# Patient Record
Sex: MALE | Race: WHITE | Employment: FULL TIME | URBAN - NONMETROPOLITAN AREA
[De-identification: names, ages, dates, MRNs, and addresses within clinical notes are randomized per-mention and may not be internally consistent; named-entity substitution may affect disease eponyms.]

---

## 2022-06-29 ENCOUNTER — HOSPITAL ENCOUNTER (INPATIENT)
Age: 47
LOS: 2 days | Discharge: HOME | DRG: 287 | End: 2022-07-01
Attending: EMERGENCY MEDICINE | Admitting: STUDENT IN AN ORGANIZED HEALTH CARE EDUCATION/TRAINING PROGRAM
Payer: COMMERCIAL

## 2022-06-29 ENCOUNTER — APPOINTMENT (OUTPATIENT)
Dept: GENERAL RADIOLOGY | Age: 47
DRG: 287 | End: 2022-06-29
Payer: COMMERCIAL

## 2022-06-29 DIAGNOSIS — J18.9 ATYPICAL PNEUMONIA: ICD-10-CM

## 2022-06-29 DIAGNOSIS — I50.31 ACUTE DIASTOLIC CONGESTIVE HEART FAILURE (HCC): Primary | ICD-10-CM

## 2022-06-29 DIAGNOSIS — I44.7 LEFT BUNDLE BRANCH BLOCK: ICD-10-CM

## 2022-06-29 DIAGNOSIS — R93.1 ABNORMAL ECHOCARDIOGRAM: ICD-10-CM

## 2022-06-29 LAB
ALBUMIN SERPL-MCNC: 4 G/DL (ref 3.5–5.2)
ALP BLD-CCNC: 77 U/L (ref 40–130)
ALT SERPL-CCNC: 20 U/L (ref 5–41)
ANION GAP SERPL CALCULATED.3IONS-SCNC: 13 MMOL/L (ref 7–19)
AST SERPL-CCNC: 22 U/L (ref 5–40)
BASOPHILS ABSOLUTE: 0.1 K/UL (ref 0–0.2)
BASOPHILS RELATIVE PERCENT: 0.2 % (ref 0–1)
BILIRUB SERPL-MCNC: <0.2 MG/DL (ref 0.2–1.2)
BILIRUBIN URINE: NEGATIVE
BLOOD, URINE: NEGATIVE
BUN BLDV-MCNC: 21 MG/DL (ref 6–20)
C-REACTIVE PROTEIN: 1.79 MG/DL (ref 0–0.5)
CALCIUM SERPL-MCNC: 9.1 MG/DL (ref 8.6–10)
CHLORIDE BLD-SCNC: 107 MMOL/L (ref 98–111)
CLARITY: CLEAR
CO2: 26 MMOL/L (ref 22–29)
COLOR: YELLOW
CREAT SERPL-MCNC: 0.8 MG/DL (ref 0.5–1.2)
EOSINOPHILS ABSOLUTE: 0 K/UL (ref 0–0.6)
EOSINOPHILS RELATIVE PERCENT: 0.1 % (ref 0–5)
GFR AFRICAN AMERICAN: >59
GFR NON-AFRICAN AMERICAN: >60
GLUCOSE BLD-MCNC: 95 MG/DL (ref 74–109)
GLUCOSE URINE: NEGATIVE MG/DL
HCT VFR BLD CALC: 43.4 % (ref 42–52)
HEMOGLOBIN: 13.9 G/DL (ref 14–18)
IMMATURE GRANULOCYTES #: 0.2 K/UL
KETONES, URINE: NEGATIVE MG/DL
LEUKOCYTE ESTERASE, URINE: NEGATIVE
LV EF: 35 %
LVEF MODALITY: NORMAL
LYMPHOCYTES ABSOLUTE: 2.6 K/UL (ref 1.1–4.5)
LYMPHOCYTES RELATIVE PERCENT: 10.7 % (ref 20–40)
MCH RBC QN AUTO: 28.5 PG (ref 27–31)
MCHC RBC AUTO-ENTMCNC: 32 G/DL (ref 33–37)
MCV RBC AUTO: 88.9 FL (ref 80–94)
MONOCYTES ABSOLUTE: 1.3 K/UL (ref 0–0.9)
MONOCYTES RELATIVE PERCENT: 5.2 % (ref 0–10)
NEUTROPHILS ABSOLUTE: 20.4 K/UL (ref 1.5–7.5)
NEUTROPHILS RELATIVE PERCENT: 83 % (ref 50–65)
NITRITE, URINE: NEGATIVE
PDW BLD-RTO: 13.3 % (ref 11.5–14.5)
PH UA: 8 (ref 5–8)
PLATELET # BLD: 323 K/UL (ref 130–400)
PMV BLD AUTO: 9.9 FL (ref 9.4–12.4)
POTASSIUM REFLEX MAGNESIUM: 3.6 MMOL/L (ref 3.5–5)
PRO-BNP: 4483 PG/ML (ref 0–450)
PROCALCITONIN: 0.08 NG/ML (ref 0–0.09)
PROTEIN UA: NEGATIVE MG/DL
RBC # BLD: 4.88 M/UL (ref 4.7–6.1)
SARS-COV-2, NAAT: NOT DETECTED
SODIUM BLD-SCNC: 146 MMOL/L (ref 136–145)
SPECIFIC GRAVITY UA: 1.02 (ref 1–1.03)
T4 FREE: 1.17 NG/DL (ref 0.93–1.7)
TOTAL PROTEIN: 6.9 G/DL (ref 6.6–8.7)
TROPONIN: <0.01 NG/ML (ref 0–0.03)
TSH SERPL DL<=0.05 MIU/L-ACNC: 1.98 UIU/ML (ref 0.27–4.2)
UROBILINOGEN, URINE: 0.2 E.U./DL
WBC # BLD: 24.6 K/UL (ref 4.8–10.8)

## 2022-06-29 PROCEDURE — 93005 ELECTROCARDIOGRAM TRACING: CPT | Performed by: EMERGENCY MEDICINE

## 2022-06-29 PROCEDURE — 99285 EMERGENCY DEPT VISIT HI MDM: CPT

## 2022-06-29 PROCEDURE — 86140 C-REACTIVE PROTEIN: CPT

## 2022-06-29 PROCEDURE — 1210000000 HC MED SURG R&B

## 2022-06-29 PROCEDURE — 71045 X-RAY EXAM CHEST 1 VIEW: CPT | Performed by: RADIOLOGY

## 2022-06-29 PROCEDURE — 99221 1ST HOSP IP/OBS SF/LOW 40: CPT | Performed by: INTERNAL MEDICINE

## 2022-06-29 PROCEDURE — 36415 COLL VENOUS BLD VENIPUNCTURE: CPT

## 2022-06-29 PROCEDURE — 6370000000 HC RX 637 (ALT 250 FOR IP)

## 2022-06-29 PROCEDURE — 83880 ASSAY OF NATRIURETIC PEPTIDE: CPT

## 2022-06-29 PROCEDURE — 81003 URINALYSIS AUTO W/O SCOPE: CPT

## 2022-06-29 PROCEDURE — 94640 AIRWAY INHALATION TREATMENT: CPT

## 2022-06-29 PROCEDURE — 84484 ASSAY OF TROPONIN QUANT: CPT

## 2022-06-29 PROCEDURE — 87040 BLOOD CULTURE FOR BACTERIA: CPT

## 2022-06-29 PROCEDURE — C8929 TTE W OR WO FOL WCON,DOPPLER: HCPCS

## 2022-06-29 PROCEDURE — 84443 ASSAY THYROID STIM HORMONE: CPT

## 2022-06-29 PROCEDURE — 2580000003 HC RX 258: Performed by: EMERGENCY MEDICINE

## 2022-06-29 PROCEDURE — 96374 THER/PROPH/DIAG INJ IV PUSH: CPT

## 2022-06-29 PROCEDURE — 85025 COMPLETE CBC W/AUTO DIFF WBC: CPT

## 2022-06-29 PROCEDURE — 93005 ELECTROCARDIOGRAM TRACING: CPT | Performed by: INTERNAL MEDICINE

## 2022-06-29 PROCEDURE — 84145 PROCALCITONIN (PCT): CPT

## 2022-06-29 PROCEDURE — 87635 SARS-COV-2 COVID-19 AMP PRB: CPT

## 2022-06-29 PROCEDURE — 6360000004 HC RX CONTRAST MEDICATION: Performed by: STUDENT IN AN ORGANIZED HEALTH CARE EDUCATION/TRAINING PROGRAM

## 2022-06-29 PROCEDURE — 84439 ASSAY OF FREE THYROXINE: CPT

## 2022-06-29 PROCEDURE — 6360000002 HC RX W HCPCS: Performed by: EMERGENCY MEDICINE

## 2022-06-29 PROCEDURE — 6370000000 HC RX 637 (ALT 250 FOR IP): Performed by: INTERNAL MEDICINE

## 2022-06-29 PROCEDURE — 6360000002 HC RX W HCPCS

## 2022-06-29 PROCEDURE — 71045 X-RAY EXAM CHEST 1 VIEW: CPT

## 2022-06-29 PROCEDURE — 80053 COMPREHEN METABOLIC PANEL: CPT

## 2022-06-29 RX ORDER — BUPROPION HYDROCHLORIDE 150 MG/1
150 TABLET ORAL DAILY
COMMUNITY
Start: 2021-07-25

## 2022-06-29 RX ORDER — FUROSEMIDE 20 MG/1
20 TABLET ORAL DAILY
Status: ON HOLD | COMMUNITY
End: 2022-07-01 | Stop reason: HOSPADM

## 2022-06-29 RX ORDER — ERGOCALCIFEROL (VITAMIN D2) 1250 MCG
CAPSULE ORAL WEEKLY
COMMUNITY
Start: 2021-08-03

## 2022-06-29 RX ORDER — ACETAMINOPHEN 650 MG/1
650 SUPPOSITORY RECTAL EVERY 6 HOURS PRN
Status: DISCONTINUED | OUTPATIENT
Start: 2022-06-29 | End: 2022-07-01 | Stop reason: HOSPADM

## 2022-06-29 RX ORDER — ACETAMINOPHEN 325 MG/1
650 TABLET ORAL EVERY 6 HOURS PRN
Status: DISCONTINUED | OUTPATIENT
Start: 2022-06-29 | End: 2022-07-01 | Stop reason: HOSPADM

## 2022-06-29 RX ORDER — PANTOPRAZOLE SODIUM 40 MG/1
40 TABLET, DELAYED RELEASE ORAL
Status: DISCONTINUED | OUTPATIENT
Start: 2022-06-30 | End: 2022-07-01 | Stop reason: HOSPADM

## 2022-06-29 RX ORDER — SPIRONOLACTONE 25 MG/1
25 TABLET ORAL 2 TIMES DAILY
Status: DISCONTINUED | OUTPATIENT
Start: 2022-06-29 | End: 2022-07-01 | Stop reason: HOSPADM

## 2022-06-29 RX ORDER — ARMODAFINIL 250 MG/1
TABLET ORAL DAILY PRN
COMMUNITY

## 2022-06-29 RX ORDER — BUDESONIDE 0.25 MG/2ML
0.25 INHALANT ORAL 2 TIMES DAILY
Status: DISCONTINUED | OUTPATIENT
Start: 2022-06-29 | End: 2022-06-30

## 2022-06-29 RX ORDER — LISINOPRIL 10 MG/1
5 TABLET ORAL DAILY
Status: DISCONTINUED | OUTPATIENT
Start: 2022-06-29 | End: 2022-06-29

## 2022-06-29 RX ORDER — FUROSEMIDE 10 MG/ML
40 INJECTION INTRAMUSCULAR; INTRAVENOUS 2 TIMES DAILY
Status: COMPLETED | OUTPATIENT
Start: 2022-06-29 | End: 2022-06-30

## 2022-06-29 RX ORDER — POTASSIUM CHLORIDE 20 MEQ/1
20 TABLET, EXTENDED RELEASE ORAL DAILY
Status: ON HOLD | COMMUNITY
End: 2022-07-01 | Stop reason: SDUPTHER

## 2022-06-29 RX ORDER — ENOXAPARIN SODIUM 100 MG/ML
40 INJECTION SUBCUTANEOUS DAILY
Status: DISCONTINUED | OUTPATIENT
Start: 2022-06-29 | End: 2022-06-29 | Stop reason: DRUGHIGH

## 2022-06-29 RX ORDER — HYDROCODONE BITARTRATE AND ACETAMINOPHEN 7.5; 325 MG/1; MG/1
1 TABLET ORAL EVERY 6 HOURS PRN
Status: DISCONTINUED | OUTPATIENT
Start: 2022-06-29 | End: 2022-07-01 | Stop reason: HOSPADM

## 2022-06-29 RX ORDER — SODIUM CHLORIDE 9 MG/ML
INJECTION, SOLUTION INTRAVENOUS PRN
Status: DISCONTINUED | OUTPATIENT
Start: 2022-06-29 | End: 2022-07-01 | Stop reason: HOSPADM

## 2022-06-29 RX ORDER — ONDANSETRON 2 MG/ML
4 INJECTION INTRAMUSCULAR; INTRAVENOUS EVERY 6 HOURS PRN
Status: DISCONTINUED | OUTPATIENT
Start: 2022-06-29 | End: 2022-07-01 | Stop reason: HOSPADM

## 2022-06-29 RX ORDER — SODIUM CHLORIDE 0.9 % (FLUSH) 0.9 %
5-40 SYRINGE (ML) INJECTION EVERY 12 HOURS SCHEDULED
Status: DISCONTINUED | OUTPATIENT
Start: 2022-06-29 | End: 2022-07-01 | Stop reason: HOSPADM

## 2022-06-29 RX ORDER — BUPROPION HYDROCHLORIDE 150 MG/1
150 TABLET ORAL DAILY
Status: DISCONTINUED | OUTPATIENT
Start: 2022-06-29 | End: 2022-07-01 | Stop reason: HOSPADM

## 2022-06-29 RX ORDER — SODIUM CHLORIDE 0.9 % (FLUSH) 0.9 %
5-40 SYRINGE (ML) INJECTION EVERY 12 HOURS SCHEDULED
Status: DISCONTINUED | OUTPATIENT
Start: 2022-06-29 | End: 2022-06-29 | Stop reason: SDUPTHER

## 2022-06-29 RX ORDER — ENOXAPARIN SODIUM 100 MG/ML
30 INJECTION SUBCUTANEOUS 2 TIMES DAILY
Status: DISCONTINUED | OUTPATIENT
Start: 2022-06-30 | End: 2022-06-30

## 2022-06-29 RX ORDER — FUROSEMIDE 10 MG/ML
20 INJECTION INTRAMUSCULAR; INTRAVENOUS ONCE
Status: COMPLETED | OUTPATIENT
Start: 2022-06-29 | End: 2022-06-29

## 2022-06-29 RX ORDER — CARVEDILOL 6.25 MG/1
3.12 TABLET ORAL 2 TIMES DAILY WITH MEALS
Status: DISCONTINUED | OUTPATIENT
Start: 2022-06-29 | End: 2022-07-01 | Stop reason: HOSPADM

## 2022-06-29 RX ORDER — PREDNISONE 50 MG/1
50 TABLET ORAL DAILY
Status: ON HOLD | COMMUNITY
End: 2022-07-01 | Stop reason: HOSPADM

## 2022-06-29 RX ORDER — ACYCLOVIR 800 MG/1
TABLET ORAL
COMMUNITY
Start: 2021-08-31

## 2022-06-29 RX ORDER — POLYETHYLENE GLYCOL 3350 17 G/17G
17 POWDER, FOR SOLUTION ORAL DAILY PRN
Status: DISCONTINUED | OUTPATIENT
Start: 2022-06-29 | End: 2022-07-01 | Stop reason: HOSPADM

## 2022-06-29 RX ORDER — POTASSIUM CHLORIDE 20 MEQ/1
20 TABLET, EXTENDED RELEASE ORAL DAILY
Status: DISCONTINUED | OUTPATIENT
Start: 2022-06-29 | End: 2022-06-30

## 2022-06-29 RX ORDER — IPRATROPIUM BROMIDE AND ALBUTEROL SULFATE 2.5; .5 MG/3ML; MG/3ML
1 SOLUTION RESPIRATORY (INHALATION)
Status: DISCONTINUED | OUTPATIENT
Start: 2022-06-29 | End: 2022-06-30

## 2022-06-29 RX ORDER — OMEPRAZOLE 40 MG/1
40 CAPSULE, DELAYED RELEASE ORAL DAILY
COMMUNITY

## 2022-06-29 RX ORDER — SODIUM CHLORIDE 9 MG/ML
INJECTION, SOLUTION INTRAVENOUS PRN
Status: DISCONTINUED | OUTPATIENT
Start: 2022-06-29 | End: 2022-06-29 | Stop reason: SDUPTHER

## 2022-06-29 RX ORDER — HYDROCODONE BITARTRATE AND ACETAMINOPHEN 7.5; 325 MG/1; MG/1
1 TABLET ORAL EVERY 6 HOURS PRN
COMMUNITY

## 2022-06-29 RX ORDER — ONDANSETRON 4 MG/1
4 TABLET, ORALLY DISINTEGRATING ORAL EVERY 8 HOURS PRN
Status: DISCONTINUED | OUTPATIENT
Start: 2022-06-29 | End: 2022-07-01 | Stop reason: HOSPADM

## 2022-06-29 RX ORDER — SODIUM CHLORIDE 0.9 % (FLUSH) 0.9 %
5-40 SYRINGE (ML) INJECTION PRN
Status: DISCONTINUED | OUTPATIENT
Start: 2022-06-29 | End: 2022-07-01 | Stop reason: HOSPADM

## 2022-06-29 RX ORDER — POTASSIUM CHLORIDE 7.45 MG/ML
10 INJECTION INTRAVENOUS PRN
Status: DISCONTINUED | OUTPATIENT
Start: 2022-06-29 | End: 2022-07-01 | Stop reason: HOSPADM

## 2022-06-29 RX ORDER — SODIUM CHLORIDE 0.9 % (FLUSH) 0.9 %
5-40 SYRINGE (ML) INJECTION PRN
Status: DISCONTINUED | OUTPATIENT
Start: 2022-06-29 | End: 2022-06-29 | Stop reason: SDUPTHER

## 2022-06-29 RX ORDER — POTASSIUM CHLORIDE 20 MEQ/1
40 TABLET, EXTENDED RELEASE ORAL PRN
Status: DISCONTINUED | OUTPATIENT
Start: 2022-06-29 | End: 2022-07-01 | Stop reason: HOSPADM

## 2022-06-29 RX ADMIN — SPIRONOLACTONE 25 MG: 25 TABLET ORAL at 18:20

## 2022-06-29 RX ADMIN — CEFTRIAXONE 1000 MG: 1 INJECTION, POWDER, FOR SOLUTION INTRAMUSCULAR; INTRAVENOUS at 13:33

## 2022-06-29 RX ADMIN — FUROSEMIDE 20 MG: 10 INJECTION, SOLUTION INTRAMUSCULAR; INTRAVENOUS at 10:53

## 2022-06-29 RX ADMIN — IPRATROPIUM BROMIDE AND ALBUTEROL SULFATE 1 AMPULE: 2.5; .5 SOLUTION RESPIRATORY (INHALATION) at 18:58

## 2022-06-29 RX ADMIN — POTASSIUM CHLORIDE 20 MEQ: 20 TABLET, EXTENDED RELEASE ORAL at 17:19

## 2022-06-29 RX ADMIN — BUPROPION HYDROCHLORIDE 150 MG: 150 TABLET, EXTENDED RELEASE ORAL at 17:19

## 2022-06-29 RX ADMIN — LISINOPRIL 5 MG: 10 TABLET ORAL at 17:19

## 2022-06-29 RX ADMIN — CARVEDILOL 3.12 MG: 6.25 TABLET, FILM COATED ORAL at 18:20

## 2022-06-29 RX ADMIN — PERFLUTREN 1.5 ML: 6.52 INJECTION, SUSPENSION INTRAVENOUS at 17:02

## 2022-06-29 RX ADMIN — BUDESONIDE 250 MCG: 0.25 SUSPENSION RESPIRATORY (INHALATION) at 18:58

## 2022-06-29 RX ADMIN — ASPIRIN 325 MG: 325 TABLET, COATED ORAL at 18:20

## 2022-06-29 RX ADMIN — FUROSEMIDE 40 MG: 10 INJECTION, SOLUTION INTRAMUSCULAR; INTRAVENOUS at 17:19

## 2022-06-29 RX ADMIN — IPRATROPIUM BROMIDE AND ALBUTEROL SULFATE 1 AMPULE: 2.5; .5 SOLUTION RESPIRATORY (INHALATION) at 15:57

## 2022-06-29 RX ADMIN — ENOXAPARIN SODIUM 40 MG: 100 INJECTION SUBCUTANEOUS at 17:19

## 2022-06-29 ASSESSMENT — ENCOUNTER SYMPTOMS
CHOKING: 0
FACIAL SWELLING: 0
BLOOD IN STOOL: 0
WHEEZING: 0
ABDOMINAL PAIN: 0
APNEA: 0
CHEST TIGHTNESS: 0
SINUS PRESSURE: 0
CONSTIPATION: 0
SHORTNESS OF BREATH: 1
DIARRHEA: 0
COLOR CHANGE: 0
VOMITING: 0
ABDOMINAL DISTENTION: 0
COUGH: 1
VOICE CHANGE: 0
NAUSEA: 0
EYE DISCHARGE: 0
SORE THROAT: 0

## 2022-06-29 ASSESSMENT — PAIN SCALES - GENERAL: PAINLEVEL_OUTOF10: 0

## 2022-06-29 ASSESSMENT — PAIN - FUNCTIONAL ASSESSMENT: PAIN_FUNCTIONAL_ASSESSMENT: NONE - DENIES PAIN

## 2022-06-29 NOTE — ED NOTES
Pt states he's had SOB since Sunday -  Pt hospitalized at [facility] overnight Monday - pt states Tuesday morning he had abnormal echo, pt states cardiologist sent him here to the ER.  Pt c/o SOB and presents with cough - pt also fatigued     MICH Singh  06/29/22 4398

## 2022-06-29 NOTE — ED PROVIDER NOTES
[facility] EMERGENCY DEPT  eMERGENCY dEPARTMENT eNCOUnter      Pt Name: Tanisha Armstrong  MRN: [id number]  [place] 1975  Date of evaluation: 6/29/2022  Provider: Ari Gutierrez MD    [address]       Chief Complaint   Patient presents with    Shortness of Breath    Abnormal Test Results     echo done at [address]   (Location/Symptom, Timing/Onset,Context/Setting, Quality, Duration, Modifying Factors, Severity)  Note limiting factors. Tanisha Armstrong is a 49 y.o. male who presents to the emergency department evaluation of shortness of breath. 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 Presents with recent respiratory difficulties. He developed a dry cough and shortness of breath went to [facility] emergency department was admitted overnight. COVID and molecular panel reported as negative. He had a negative CTA for PE his D-dimer was 4. He is 7 weeks postop total knee on the right. He is a non-smoker never has. His CT reportedly showed tree-in-bud pattern. He was given Rocephin and prescribed antibiotics and steroids. He states after Lasix he began to breathe better. He had echocardiogram done at time of discharge they called him today and told him to come to the hospital because it was abnormal.  Reportedly the CT did show some mediastinal adenopathy. He has never had COVID he has been vaccinated and boosted. He is not describing chest pain of anginal equivalent. No prior cardiac history. Pretty healthy individual except for his obesity. With this episode he denies having any fever and the cough is been nonproductive. The history is provided by the patient, the spouse and medical records. NursingNotes were reviewed. REVIEW OF SYSTEMS    (2-9 systems for level 4, 10 or more for level 5)     Review of Systems   Constitutional: Negative for chills and fever.    HENT: Negative for congestion, drooling, facial swelling, nosebleeds, sinus pressure, sore throat and voice change. Eyes: Negative for discharge. Respiratory: Positive for cough and shortness of breath. Negative for apnea and choking. Cardiovascular: Negative for chest pain, palpitations and leg swelling. Gastrointestinal: Negative for abdominal pain, blood in stool, constipation, diarrhea and nausea. Genitourinary: Negative for dysuria and enuresis. Musculoskeletal: Negative for joint swelling. Skin: Negative for rash and wound. Neurological: Negative for seizures and syncope. Psychiatric/Behavioral: Negative for behavioral problems, hallucinations and suicidal ideas. All other systems reviewed and are negative. A complete review of systems was performed and is negative except as noted above in the HPI. PAST MEDICAL HISTORY     Past Medical History:   Diagnosis Date    Depression     Gout          SURGICAL HISTORY       Past Surgical History:   Procedure Laterality Date    CATARACT EXTRACTION Right     LASIK      TOTAL KNEE ARTHROPLASTY Right          CURRENT MEDICATIONS       Previous Medications    ACYCLOVIR (ZOVIRAX) 800 MG TABLET        ARMODAFINIL 250 MG TABS        BUPROPION (WELLBUTRIN XL) 150 MG EXTENDED RELEASE TABLET        ERGOCALCIFEROL (ERGOCALCIFEROL) 1.25 MG (46129 UT) CAPSULE        FUROSEMIDE (LASIX) 20 MG TABLET    Take 20 mg by mouth daily    OMEPRAZOLE (PRILOSEC) 40 MG DELAYED RELEASE CAPSULE        POTASSIUM CHLORIDE (KLOR-CON M) 20 MEQ EXTENDED RELEASE TABLET    Take 20 mEq by mouth daily    PREDNISONE (DELTASONE) 50 MG TABLET    Take 50 mg by mouth daily       ALLERGIES     Patient has no known allergies. FAMILY HISTORY     History reviewed. No pertinent family history.        SOCIAL HISTORY       Social History     Socioeconomic History    Marital status:      Spouse name: None    Number of children: None    Years of education: None    Highest education level: None   Occupational History    None   Tobacco Use    Smoking status: Never Smoker    Smokeless tobacco: Never Used   Vaping Use    Vaping Use: Never used   Substance and Sexual Activity    Alcohol use: Not Currently     Comment: once a week     Drug use: Never    Sexual activity: None   Other Topics Concern    None   Social History Narrative    None     Social Determinants of Health     Financial Resource Strain:     Difficulty of Paying Living Expenses: Not on file   Food Insecurity:     Worried About Running Out of Food in the Last Year: Not on file    Lilly of Food in the Last Year: Not on file   Transportation Needs:     Lack of Transportation (Medical): Not on file    Lack of Transportation (Non-Medical):  Not on file   Physical Activity:     Days of Exercise per Week: Not on file    Minutes of Exercise per Session: Not on file   Stress:     Feeling of Stress : Not on file   Social Connections:     Frequency of Communication with Friends and Family: Not on file    Frequency of Social Gatherings with Friends and Family: Not on file    Attends Orthodoxy Services: Not on file    Active Member of [address] [place] [address] or Organizations: Not on file    Attends Club or Organization Meetings: Not on file    Marital Status: Not on file   Intimate Partner Violence:     Fear of Current or Ex-Partner: Not on file    Emotionally Abused: Not on file    Physically Abused: Not on file    Sexually Abused: Not on file   Housing Stability:     Unable to Pay for Housing in the Last Year: Not on file    Number of [place] in the Last Year: Not on file    Unstable Housing in the Last Year: Not on file       SCREENINGS    [place] Coma Scale  Eye Opening: Spontaneous  Best Verbal Response: Oriented  Best Motor Response: Obeys commands  [place] Coma Scale Score: 15        PHYSICAL EXAM    (up to 7 for level 4, 8 or more for level 5)     ED Triage Vitals [06/29/22 0938]   BP Temp Temp src Heart Rate Resp SpO2 Height Weight   (!) 150/90 97.8 °F (36.6 °C) -- (!) 110 18 93 % -- 250 lb (113.4 kg) Physical Exam  Vitals and nursing note reviewed. Constitutional:       Appearance: He is well-developed. HENT:      Head: Normocephalic and atraumatic. Right Ear: External ear normal.      Left Ear: External ear normal.   Eyes:      General: No scleral icterus. Conjunctiva/sclera: Conjunctivae normal.      Pupils: Pupils are equal, round, and reactive to light. Cardiovascular:      Rate and Rhythm: Regular rhythm. Tachycardia present. Pulses: Normal pulses. Heart sounds: Normal heart sounds. No murmur heard. Pulmonary:      Comments: He is mildly tachypneic he has audible expiratory sounds on auscultation. Not typical Rales. Abdominal:      General: Bowel sounds are normal.      Palpations: Abdomen is soft. Tenderness: There is no abdominal tenderness. Musculoskeletal:         General: Normal range of motion. Cervical back: Normal range of motion and neck supple. Right lower leg: No edema. Left lower leg: No edema. Skin:     General: Skin is warm and dry. Coloration: Skin is not jaundiced or pale. Neurological:      General: No focal deficit present. Mental Status: He is alert and oriented to person, place, and time. Psychiatric:         Mood and Affect: Mood normal.         Behavior: Behavior normal.         DIAGNOSTIC RESULTS     EKG: All EKG's are interpreted by the Emergency Department Physician who either signs or Co-signs this chart in the absence of a cardiologist.    Sinus rhythm rate 103. TX interval 157. QTc 458. Appears to be a left bundle branch block. I have Candelario EKGs to compare with at this facility. But his EKG done at [facility] is very similar left bundle branch block sinus rhythm.       RADIOLOGY:   Non-plain film images such as CT, Ultrasound and MRI are read by the radiologist. Plainradiographic images are visualized and preliminarily interpreted by the emergency physician with the below findings:    I have reviewed the images and results. Interpretation per the Radiologist below, if available at the time of this note:    XR CHEST PORTABLE   Final Result   Questionable CHF versus perihilar infiltrates as described. Recommendation: Follow up as clinically indicated. Electronically Signed by Eun Morataya MD at 29-Jun-2022 11:13:12 AM                     ED BEDSIDE ULTRASOUND:   Performed by ED Physician - none    LABS:  Labs Reviewed   CBC WITH AUTO DIFFERENTIAL - Abnormal; Notable for the following components:       Result Value    WBC 24.6 (*)     Hemoglobin 13.9 (*)     MCHC 32.0 (*)     Neutrophils % 83.0 (*)     Lymphocytes % 10.7 (*)     Neutrophils Absolute 20.4 (*)     Monocytes Absolute 1.30 (*)     All other components within normal limits   COMPREHENSIVE METABOLIC PANEL W/ REFLEX TO MG FOR LOW K - Abnormal; Notable for the following components:    Sodium 146 (*)     BUN 21 (*)     All other components within normal limits   BRAIN NATRIURETIC PEPTIDE - Abnormal; Notable for the following components:    Pro-BNP 4,483 (*)     All other components within normal limits   C-REACTIVE PROTEIN - Abnormal; Notable for the following components:    CRP 1.79 (*)     All other components within normal limits   COVID-19, RAPID   TROPONIN   PROCALCITONIN       All other labs were within normal range or not returned as of this dictation. EMERGENCY DEPARTMENT COURSE and DIFFERENTIALDIAGNOSIS/MDM:   Vitals:    Vitals:    06/29/22 0938 06/29/22 1200   BP: (!) 150/90 (!) 135/95   Pulse: (!) 110 100   Resp: 18 25   Temp: 97.8 °F (36.6 °C)    SpO2: 93% 96%   Weight: 250 lb (113.4 kg)        MDM  Number of Diagnoses or Management Options  Abnormal echocardiogram  Acute diastolic congestive heart failure (HCC)  Atypical pneumonia  Left bundle branch block  Diagnosis management comments: I spoke with Dr. Sheri Ponce cardiologist who read the patient's echo at [facility].   He saw apical hypokinesis a grade 3 just Aulik function some LVH some mild mitral regurg and abnormal EKG for this man in his age. I have now received a copy from [facility] of the written report. It apparently was a difficult study    . Also other records there showed chest x-ray similar to ours CTA no PE but but injury appearance which makes me think of an atypical pneumonia however there is some cardiac abnormality going on here. I gave the patient some oxygen for comfort. He has had about 2 L out. His COVID was negative and he had molecular panel that was all negative at [facility] a copy of which I have. CONSULTS:  None    PROCEDURES:  Unless otherwise notedbelow, none     Procedures    FINAL IMPRESSION     1. Acute diastolic congestive heart failure ([address]    2. Atypical pneumonia    3. Left bundle branch block    4.  Abnormal echocardiogram          DISPOSITION/PLAN   DISPOSITION Decision To Admit 06/29/2022 12:53:05 PM      PATIENT REFERRED TO:  @FUP@    DISCHARGE MEDICATIONS:  New Prescriptions    No medications on file          (Please note that portions of this note were completed with a voice recognition program.  Efforts were made to edit the dictations butoccasionally words are mis-transcribed.)    Dolores Joseph MD (electronically signed)  AttendingEmergency Physician          Kin Bermudez MD  06/29/22 1664

## 2022-06-29 NOTE — H&P
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 Patient has been recently hospitalized and [facility] due to atypical pneumonia and acute diastolic heart failure. Patient was discharged on 6/28 evening. While hospitalized, received Rocephin and steroids. Patient states that he woke up today at 4 AM dyspneic at rest and hearing a \"rattling\" sound in his chest.  He states he got out of bed and moved to the recliner and then attempt to help his breathing however this provided minimal relief. He states [facility] respiratory therapy notified him of abnormal echocardiogram results and instructed him to [facility] ER for further evaluation. Patient states that he has had an ongoing dry cough and dyspnea on exertion since Thursday. While at [facility] had negative molecular panel and negative COVID results. D-dimer was elevated at 4 and did have negative CTA for PE. Of note patient has had right total knee and is 7 weeks postop. He states he had been prescribed aspirin for 1 month and has completed this course. Preop evaluation EKG revealed left bundle branch block and today EKG revealed left bundle branch block. Denies fever, chills, nausea, vomiting, abdominal pain, chest pain. Denies prior heart history. Denies hemoptysis or lower extremity swelling. Non-smoker, nondrinker, and denies illicit drug use. Work-up in ER CXR questionable CHF versus perihilar infiltrates, troponin negative, BNP 4483, WBC 24, and  procalcitonin 0.08. Received Rocephin 1 g and Lasix 20 mg IV while in ER.   Patient is to admitted to the hospitalist service with consultation to cardiology due to acute diastolic heart failure. Past Medical History:        Diagnosis Date    Depression     Gout        Past Surgical History:        Procedure Laterality Date    CATARACT EXTRACTION Right     LASIK      TOTAL KNEE ARTHROPLASTY Right        Home Medications:  Prior to Admission medications    Medication Sig Start Date End Date Taking? Authorizing Provider   buPROPion (WELLBUTRIN XL) 150 MG extended release tablet  7/25/21  Yes Historical Provider, MD   ergocalciferol (ERGOCALCIFEROL) 1.25 MG (59218 UT) capsule  8/3/21  Yes Historical Provider, MD   acyclovir (ZOVIRAX) 800 MG tablet  8/31/21  Yes Historical Provider, MD   furosemide (LASIX) 20 MG tablet Take 20 mg by mouth daily   Yes Historical Provider, MD   potassium chloride (KLOR-CON M) 20 MEQ extended release tablet Take 20 mEq by mouth daily   Yes Historical Provider, MD   predniSONE (DELTASONE) 50 MG tablet Take 50 mg by mouth daily   Yes Historical Provider, MD   omeprazole (PRILOSEC) 40 MG delayed release capsule     Historical Provider, MD   Armodafinil 250 MG TABS     Historical Provider, MD       Allergies:    Patient has no known allergies. Social History:    The patient currently lives home  Tobacco:   reports that he has never smoked. He has never used smokeless tobacco.  Alcohol:   reports previous alcohol use. Illicit Drugs: denies    Family History:  History reviewed. No pertinent family history. Review of Systems:   Review of Systems   Constitutional: Positive for fatigue. Negative for chills, diaphoresis and fever. HENT: Negative. Respiratory: Positive for cough (dry) and shortness of breath. Negative for chest tightness and wheezing. Cardiovascular: Negative for chest pain and palpitations. Gastrointestinal: Negative for abdominal distention, abdominal pain, constipation, nausea and vomiting. Genitourinary: Negative. Musculoskeletal: Positive for arthralgias and myalgias.    Skin: Negative for color change, pallor and rash. Neurological: Positive for weakness. Negative for tremors, syncope, light-headedness, numbness and headaches. Psychiatric/Behavioral: Negative for agitation, behavioral problems and confusion. 14 point review of systems is negative except as specifically addressed above. Physical Examination:  BP (!) 135/95   Pulse 100   Temp 97.8 °F (36.6 °C)   Resp 25   Wt 250 lb (113.4 kg)   SpO2 96%   Physical Exam  Vitals and nursing note reviewed. Constitutional:       Appearance: Normal appearance. He is not ill-appearing. HENT:      Mouth/Throat:      Mouth: Mucous membranes are moist.      Pharynx: Oropharynx is clear. Eyes:      Extraocular Movements: Extraocular movements intact. Conjunctiva/sclera: Conjunctivae normal.      Pupils: Pupils are equal, round, and reactive to light. Cardiovascular:      Rate and Rhythm: Regular rhythm. Tachycardia present. Pulses: Normal pulses. Heart sounds: Normal heart sounds. No murmur heard. Pulmonary:      Effort: Tachypnea present. No accessory muscle usage or respiratory distress. Breath sounds: Normal breath sounds. Abdominal:      General: Bowel sounds are normal. There is no distension. Palpations: Abdomen is soft. Tenderness: There is no abdominal tenderness. There is no guarding or rebound. Musculoskeletal:         General: No swelling. Normal range of motion. Cervical back: Normal range of motion and neck supple. No rigidity or tenderness. Right lower leg: No edema. Left lower leg: No edema. Comments: Incision well approximated, no erythema or swelling noted   Skin:     General: Skin is warm and dry. Capillary Refill: Capillary refill takes less than 2 seconds. Neurological:      General: No focal deficit present. Mental Status: He is alert and oriented to person, place, and time. Cranial Nerves: No cranial nerve deficit. Motor: Weakness present.    Psychiatric: Mood and Affect: Mood normal.         Behavior: Behavior normal.        Diagnostic Data:  CBC:  Recent Labs     06/29/22  1022   WBC 24.6*   HGB 13.9*   HCT 43.4        BMP:  Recent Labs     06/29/22  1022   *   K 3.6      CO2 26   BUN 21*   CREATININE 0.8   CALCIUM 9.1     Recent Labs     06/29/22  1022   AST 22   ALT 20   BILITOT <0.2   ALKPHOS 77     Cardiac Enzymes:   Recent Labs     06/29/22  1022   TROPONINI <0.01       XR CHEST PORTABLE    Result Date: 6/29/2022  NO PRIOR REPORT AVAILABLE Exam: X-RAY OF [facility] Clinical data:Shortness of breath. Technique:Single view of the chest. Prior studies: No prior studies submitted. Findings:Mild cardiomegaly, hilar congestion and perihilar airspace densities possibly edema is noted. No pleural effusion. Cardiac silhouette is within normal limits. No acute osseous abnormality is detected. Questionable CHF versus perihilar infiltrates as described. Recommendation: Follow up as clinically indicated.  Electronically Signed by Belen Briggs MD at 29-Jun-2022 11:13:12 AM               Assessment/Plan:  Principal Problem:    Acute diastolic (congestive) heart failure [facility])   - Cardiology consultation recommendations  appreciated              - ECHO from OSH: LV enlargement, mild LVH,  severe diastolic dysfunction, moderate pulmonary  Hypertension   -BNP    -TSH               - IV Lasix twice daily   - ACEi               - Low-sodium diet              - Monitor I's and O's closely              - Daily weights   -Daily labs              - Monitor on telemetry    Atypical pneumonia  -Procalcitonin              - Bronchodilators              - Encourage deep breathing and cough              - Incentive spirometry              - Supplemental oxygen as needed   -Respiratory culture              - Follow blood cultures       DVT prophylaxis: Lovenox    Signed:  ROSCOE Coyle - CNP, 6/29/2022 1:22 PM

## 2022-06-29 NOTE — CONSULTS
[facility]) Cardiology   Consult Note       Requesting MD:  Geovani Denny MD   Admit Status:         Patient:    Johnny Menendez  [id number]  1975         Chief Complaint: Recent admission for congestive heart failure  HPI: Patient is a 49 y.o. male was admitted after he was treated for congestive heart failure at [facility] with diuretic. At that time patient developed a dry cough and shortness of breath on the weekend. Echocardiogram done showed ejection fraction between 35% to 40%. There was grade 3 diastolic dysfunction. There was segmental hypokinesis in the territory of the inferior wall and septum. Estimated right ventricular systolic pressure was 57 mm. Patient denies any history of hypertension, diabetes or hyperlipidemia. He was a non-smoker and nondrinker. 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 CT of the chest showed ground glass appearance bilaterally as well as mediastinal adenopathy. He has been tested 3 times for COVID and were negative. There was no evidence of PE. Patient had total right knee surgery Js days ago. At [facility] patient was treated with IV Rocephin and IV Solu-Medrol for presumptive diagnosis of COVID. Last dose of IV Solu-Medrol was yesterday. There was leukocytosis of 24,000. There was no fever.   There has been no family history of early cardiovascular disease    Review of Systems:  HENNT: normal  PULMONARY: History of present illness  CVS:see history of present illness  ABD: denies any abdominal pain  PERIPHERL: normal  MSK: no swelling of the lower extremities  CNS: Depression  Renal: Denies any history of dysuria or frequency    Cardiac Specific Data:  Specialty Problems        Cardiology Problems    * (Principal) Acute diastolic (congestive) heart failure (HCC)              Past Medical History:  Past Medical History:   Diagnosis Date    Depression     Gout         Past Surgical History:  Past Surgical History:   Procedure Laterality Date    CATARACT EXTRACTION Right     LASIK      TOTAL KNEE ARTHROPLASTY Right        Past Family History:  History reviewed. No pertinent family history. Past Social History:  Social History     Socioeconomic History    Marital status:      Spouse name: Not on file    Number of children: Not on file    Years of education: Not on file    Highest education level: Not on file   Occupational History    Not on file   Tobacco Use    Smoking status: Never Smoker    Smokeless tobacco: Never Used   Vaping Use    Vaping Use: Never used   Substance and Sexual Activity    Alcohol use: Not Currently     Comment: once a week     Drug use: Never    Sexual activity: Not on file   Other Topics Concern    Not on file   Social History Narrative    Not on file     Social Determinants of Health     Financial Resource Strain:     Difficulty of Paying Living Expenses: Not on file   Food Insecurity:     Worried About [address] in the Last Year: Not on file    Lilly of Food in the Last Year: Not on file   Transportation Needs:     Lack of Transportation (Medical): Not on file    Lack of Transportation (Non-Medical):  Not on file   Physical Activity:     Days of Exercise per Week: Not on file    Minutes of Exercise per Session: Not on file   Stress:     Feeling of Stress : Not on file   Social Connections:     Frequency of Communication with Friends and Family: Not on file    Frequency of Social Gatherings with Friends and Family: Not on file    Attends Latter-day Services: Not on file    Active Member of Clubs or Organizations: Not on file    Attends Club or Organization Meetings: Not on file    Marital Status: Not on file   Intimate Partner Violence:     Fear of Current or Ex-Partner: Not on file    Emotionally Abused: Not on file    Physically Abused: Not on file    Sexually Abused: Not on file   Housing Stability:     Unable to Pay for Housing in the Last Year: Not on file    Number of Places Lived in the Last Year: Not on file    Unstable Housing in the Last Year: Not on file       Allergies:  No Known Allergies    Prior to Admission medications    Medication Sig Start Date End Date Taking? Authorizing Provider   buPROPion (WELLBUTRIN XL) 150 MG extended release tablet Take 150 mg by mouth daily  7/25/21  Yes Historical Provider, MD   ergocalciferol (ERGOCALCIFEROL) 1.25 MG (56793 UT) capsule once a week  8/3/21  Yes Historical Provider, MD   acyclovir (ZOVIRAX) 800 MG tablet  8/31/21  Yes Historical Provider, MD   furosemide (LASIX) 20 MG tablet Take 20 mg by mouth daily   Yes Historical Provider, MD   potassium chloride (KLOR-CON M) 20 MEQ extended release tablet Take 20 mEq by mouth daily   Yes Historical Provider, MD   predniSONE (DELTASONE) 50 MG tablet Take 50 mg by mouth daily   Yes Historical Provider, MD   HYDROcodone-acetaminophen (NORCO) 7.5-325 MG per tablet Take 1 tablet by mouth every 6 hours as needed for Pain. Yes Historical Provider, MD   omeprazole (PRILOSEC) 40 MG delayed release capsule 40 mg daily     Historical Provider, MD   Armodafinil 250 MG TABS daily as needed.      Historical Provider, MD        Current Facility-Administered Medications   Medication Dose Route Frequency Provider Last Rate Last Admin    sodium chloride flush 0.9 % injection 5-40 mL  5-40 mL IntraVENous 2 times per day ROSCOE Gibbs - CNP        sodium chloride flush 0.9 % injection 5-40 mL  5-40 mL IntraVENous PRN Eduin Durham APRN - CNP        0.9 % sodium chloride infusion   IntraVENous PRN ROSCOE Gibbs - JHONATAN        ondansetron (ZOFRAN-ODT) disintegrating tablet 4 mg  4 mg Oral Q8H PRN ROSCOE Gibbs CNP        Or    ondansetron [facility]) injection 4 mg  4 mg IntraVENous Q6H PRN ROSCOE Gibbs - JHONATAN        polyethylene glycol (GLYCOLAX) packet 17 g  17 g Oral Daily PRN Eduin Durham APRN - CNP        acetaminophen (TYLENOL) tablet 650 mg 650 mg Oral Q6H PRN Urbano Pouch, APRN - CNP        Or    acetaminophen (TYLENOL) suppository 650 mg  650 mg Rectal Q6H PRN Urbano Pouch, APRN - CNP        enoxaparin (LOVENOX) injection 40 mg  40 mg SubCUTAneous Daily Urbano Pouch, APRN - CNP   40 mg at 06/29/22 1719    furosemide (LASIX) injection 40 mg  40 mg IntraVENous BID Urbano Pouch, APRN - CNP   40 mg at 06/29/22 1719    lisinopril (PRINIVIL;ZESTRIL) tablet 5 mg  5 mg Oral Daily Urbano Pouch, APRN - CNP   5 mg at 06/29/22 1719    ipratropium-albuterol (DUONEB) nebulizer solution 1 ampule  1 ampule Inhalation Q4H WA Urbano Pouch, APRN - CNP   1 ampule at 06/29/22 1557    budesonide (PULMICORT) nebulizer suspension 250 mcg  0.25 mg Nebulization BID Urbano Pouch, APRN - CNP        potassium chloride (KLOR-CON M) extended release tablet 40 mEq  40 mEq Oral PRN Urbano Pouch, APRN - CNP        Or    potassium bicarb-citric acid (EFFER-K) effervescent tablet 40 mEq  40 mEq Oral PRN Urbano Pouch, APRN - CNP        Or    potassium chloride 10 mEq/100 mL IVPB (Peripheral Line)  10 mEq IntraVENous PRN Urbano Pouch, APRN - CNP        buPROPion (WELLBUTRIN XL) extended release tablet 150 mg  150 mg Oral Daily Urbano Pouch, APRN - CNP   150 mg at 06/29/22 1719    HYDROcodone-acetaminophen (Pili Shen) 7.5-325 MG per tablet 1 tablet  1 tablet Oral Q6H PRN Urbano Pouch, APRN - CNP        [START ON 6/30/2022] pantoprazole (PROTONIX) tablet 40 mg  40 mg Oral QAM AC Urbano Pouch, APRN - CNP        potassium chloride (KLOR-CON M) extended release tablet 20 mEq  20 mEq Oral Daily Urbano Pouch, APRN - CNP   20 mEq at 06/29/22 1719    perflutren lipid microspheres (DEFINITY) injection 1.65 mg  1.5 mL IntraVENous ONCE PRN Sunil España MD   1.5 mL at 06/29/22 1702        Current Infused Meds:   sodium chloride         Physical Exam:  Vitals:    06/29/22 1517   BP: 125/78   Pulse: (!) 106   Resp: 18   Temp: 97.9 °F (36.6 °C)   SpO2: 95% No intake or output data in the 24 hours ending 06/29/22 1721  Estimated body mass index is 34.87 kg/m² as calculated from the following:    Height as of this encounter: 5' 10\" (1.778 m). Weight as of this encounter: 243 lb (110.2 kg). PHYSICAL EXAM:  HENNT: normal  PULMONARY: normal to inspection, palpation, percussion and ascultation  CVS:Normal neck vein, S1 and S2 normal, no murmur  ABD: liver and spleen not palpable, nontender, bowel sound normal  PERIPHERL: all pulses are normal with no bruit  MSK: no swelling of the lower extremities  CNS: Normal CN 2,3,4,6,5,7,9,10,11,and 12. Oriented to time, place and person    Labs:  Recent Labs     06/29/22  1022   WBC 24.6*   HGB 13.9*          Recent Labs     06/29/22  1022   *   K 3.6      CO2 26   BUN 21*   CREATININE 0.8   CALCIUM 9.1       CK, CKMB, Troponin:   Recent Labs     06/29/22  1022   TROPONINI <0.01       Last 3 BNP:  Recent Labs     06/29/22  1022   PROBNP 4,483*             XR CHEST PORTABLE    Result Date: 6/29/2022  Questionable CHF versus perihilar infiltrates as described. Recommendation: Follow up as clinically indicated. Electronically Signed by Esme Palmer MD at 29-Jun-2022 11:13:12 AM                Assessment and Plan:  1. Recent acute on chronic systolic and diastolic heart failure, etiology unclear  2. Echocardiogram showed dilated left ventricle with hypokinesis especially in the inferior wall and septum. There was grade 3 diastolic dysfunction. There were no significant valvular lesion seen. Estimated right ventricular systolic pressure was 57 mm which is moderately elevated  3. CT of the chest showed groundglass appearance and mediastinal adenopathy  4. Leukocytosis, etiology unclear  5. Sinus tachycardia    Plan: Patient at the moment is asymptomatic, will start Entresto, beta-blocker, MRA and Demadex. Baby aspirin a day will be initiated. Cardiac catheterization is to be considered tomorrow.     I have spent 60 minutes reviewing the chart, taking history, examining the patient, discussion with the patient and the family concerning the finding, diagnoses and treatment plan. This dictation was performed using [address]. Mistake and misspelling may have been created without  realizing them. Please notify me for clarification.   Thank you    Irineo Larry MD   6/29/2022, 5:21 PM

## 2022-06-30 ENCOUNTER — APPOINTMENT (OUTPATIENT)
Dept: CARDIAC CATH/INVASIVE PROCEDURES | Age: 47
DRG: 287 | End: 2022-06-30
Payer: COMMERCIAL

## 2022-06-30 PROBLEM — I50.41 ACUTE COMBINED SYSTOLIC (CONGESTIVE) AND DIASTOLIC (CONGESTIVE) HEART FAILURE (HCC): Status: ACTIVE | Noted: 2022-06-29

## 2022-06-30 PROBLEM — I42.8 NONISCHEMIC CARDIOMYOPATHY (HCC): Status: ACTIVE | Noted: 2022-06-30

## 2022-06-30 LAB
ANION GAP SERPL CALCULATED.3IONS-SCNC: 13 MMOL/L (ref 7–19)
BUN BLDV-MCNC: 25 MG/DL (ref 6–20)
CALCIUM SERPL-MCNC: 9.2 MG/DL (ref 8.6–10)
CHLORIDE BLD-SCNC: 102 MMOL/L (ref 98–111)
CHOLESTEROL, TOTAL: 174 MG/DL (ref 160–199)
CO2: 29 MMOL/L (ref 22–29)
CREAT SERPL-MCNC: 0.9 MG/DL (ref 0.5–1.2)
EKG P AXIS: 53 DEGREES
EKG P AXIS: 63 DEGREES
EKG P-R INTERVAL: 142 MS
EKG P-R INTERVAL: 146 MS
EKG Q-T INTERVAL: 350 MS
EKG Q-T INTERVAL: 364 MS
EKG QRS DURATION: 128 MS
EKG QRS DURATION: 128 MS
EKG QTC CALCULATION (BAZETT): 427 MS
EKG QTC CALCULATION (BAZETT): 430 MS
EKG T AXIS: 129 DEGREES
EKG T AXIS: 136 DEGREES
GFR AFRICAN AMERICAN: >59
GFR NON-AFRICAN AMERICAN: >60
GLUCOSE BLD-MCNC: 84 MG/DL (ref 74–109)
HCT VFR BLD CALC: 44.3 % (ref 42–52)
HDLC SERPL-MCNC: 56 MG/DL (ref 55–121)
HEMOGLOBIN: 14.1 G/DL (ref 14–18)
LACTIC ACID: 1.6 MMOL/L (ref 0.5–1.9)
LDL CHOLESTEROL CALCULATED: 75 MG/DL
MAGNESIUM: 2.3 MG/DL (ref 1.6–2.6)
MCH RBC QN AUTO: 28.6 PG (ref 27–31)
MCHC RBC AUTO-ENTMCNC: 31.8 G/DL (ref 33–37)
MCV RBC AUTO: 89.9 FL (ref 80–94)
PDW BLD-RTO: 13.2 % (ref 11.5–14.5)
PLATELET # BLD: 312 K/UL (ref 130–400)
PMV BLD AUTO: 9.9 FL (ref 9.4–12.4)
POTASSIUM SERPL-SCNC: 3.6 MMOL/L (ref 3.5–5)
RBC # BLD: 4.93 M/UL (ref 4.7–6.1)
SODIUM BLD-SCNC: 144 MMOL/L (ref 136–145)
TRIGL SERPL-MCNC: 217 MG/DL (ref 0–149)
WBC # BLD: 13.5 K/UL (ref 4.8–10.8)

## 2022-06-30 PROCEDURE — 99153 MOD SED SAME PHYS/QHP EA: CPT

## 2022-06-30 PROCEDURE — C1769 GUIDE WIRE: HCPCS

## 2022-06-30 PROCEDURE — 6360000002 HC RX W HCPCS

## 2022-06-30 PROCEDURE — 83605 ASSAY OF LACTIC ACID: CPT

## 2022-06-30 PROCEDURE — 6370000000 HC RX 637 (ALT 250 FOR IP)

## 2022-06-30 PROCEDURE — 99152 MOD SED SAME PHYS/QHP 5/>YRS: CPT

## 2022-06-30 PROCEDURE — 6370000000 HC RX 637 (ALT 250 FOR IP): Performed by: INTERNAL MEDICINE

## 2022-06-30 PROCEDURE — 4A023N7 MEASUREMENT OF CARDIAC SAMPLING AND PRESSURE, LEFT HEART, PERCUTANEOUS APPROACH: ICD-10-PCS | Performed by: INTERNAL MEDICINE

## 2022-06-30 PROCEDURE — 2500000003 HC RX 250 WO HCPCS

## 2022-06-30 PROCEDURE — 93458 L HRT ARTERY/VENTRICLE ANGIO: CPT

## 2022-06-30 PROCEDURE — 80061 LIPID PANEL: CPT

## 2022-06-30 PROCEDURE — 6360000002 HC RX W HCPCS: Performed by: STUDENT IN AN ORGANIZED HEALTH CARE EDUCATION/TRAINING PROGRAM

## 2022-06-30 PROCEDURE — 6370000000 HC RX 637 (ALT 250 FOR IP): Performed by: STUDENT IN AN ORGANIZED HEALTH CARE EDUCATION/TRAINING PROGRAM

## 2022-06-30 PROCEDURE — 6360000004 HC RX CONTRAST MEDICATION: Performed by: STUDENT IN AN ORGANIZED HEALTH CARE EDUCATION/TRAINING PROGRAM

## 2022-06-30 PROCEDURE — C1887 CATHETER, GUIDING: HCPCS

## 2022-06-30 PROCEDURE — 2709999900 HC NON-CHARGEABLE SUPPLY

## 2022-06-30 PROCEDURE — 83735 ASSAY OF MAGNESIUM: CPT

## 2022-06-30 PROCEDURE — 80048 BASIC METABOLIC PNL TOTAL CA: CPT

## 2022-06-30 PROCEDURE — B2111ZZ FLUOROSCOPY OF MULTIPLE CORONARY ARTERIES USING LOW OSMOLAR CONTRAST: ICD-10-PCS | Performed by: INTERNAL MEDICINE

## 2022-06-30 PROCEDURE — 1210000000 HC MED SURG R&B

## 2022-06-30 PROCEDURE — 93458 L HRT ARTERY/VENTRICLE ANGIO: CPT | Performed by: INTERNAL MEDICINE

## 2022-06-30 PROCEDURE — 36415 COLL VENOUS BLD VENIPUNCTURE: CPT

## 2022-06-30 PROCEDURE — 85027 COMPLETE CBC AUTOMATED: CPT

## 2022-06-30 PROCEDURE — 99152 MOD SED SAME PHYS/QHP 5/>YRS: CPT | Performed by: INTERNAL MEDICINE

## 2022-06-30 PROCEDURE — 2580000003 HC RX 258: Performed by: INTERNAL MEDICINE

## 2022-06-30 PROCEDURE — 94640 AIRWAY INHALATION TREATMENT: CPT

## 2022-06-30 RX ORDER — POTASSIUM CHLORIDE 20 MEQ/1
20 TABLET, EXTENDED RELEASE ORAL 2 TIMES DAILY WITH MEALS
Status: COMPLETED | OUTPATIENT
Start: 2022-06-30 | End: 2022-06-30

## 2022-06-30 RX ORDER — TORSEMIDE 20 MG/1
20 TABLET ORAL DAILY
Status: DISCONTINUED | OUTPATIENT
Start: 2022-07-01 | End: 2022-07-01 | Stop reason: HOSPADM

## 2022-06-30 RX ADMIN — SPIRONOLACTONE 25 MG: 25 TABLET ORAL at 09:02

## 2022-06-30 RX ADMIN — SODIUM CHLORIDE, PRESERVATIVE FREE 10 ML: 5 INJECTION INTRAVENOUS at 22:06

## 2022-06-30 RX ADMIN — IOPAMIDOL 100 ML: 612 INJECTION, SOLUTION INTRAVENOUS at 13:12

## 2022-06-30 RX ADMIN — HYDROCODONE BITARTRATE AND ACETAMINOPHEN 1 TABLET: 7.5; 325 TABLET ORAL at 09:03

## 2022-06-30 RX ADMIN — IPRATROPIUM BROMIDE AND ALBUTEROL SULFATE 1 AMPULE: 2.5; .5 SOLUTION RESPIRATORY (INHALATION) at 14:51

## 2022-06-30 RX ADMIN — CARVEDILOL 3.12 MG: 6.25 TABLET, FILM COATED ORAL at 17:28

## 2022-06-30 RX ADMIN — FUROSEMIDE 40 MG: 10 INJECTION, SOLUTION INTRAMUSCULAR; INTRAVENOUS at 09:03

## 2022-06-30 RX ADMIN — BUDESONIDE 250 MCG: 0.25 SUSPENSION RESPIRATORY (INHALATION) at 07:22

## 2022-06-30 RX ADMIN — PANTOPRAZOLE SODIUM 40 MG: 40 TABLET, DELAYED RELEASE ORAL at 09:02

## 2022-06-30 RX ADMIN — BUPROPION HYDROCHLORIDE 150 MG: 150 TABLET, EXTENDED RELEASE ORAL at 09:02

## 2022-06-30 RX ADMIN — SODIUM CHLORIDE, PRESERVATIVE FREE 10 ML: 5 INJECTION INTRAVENOUS at 09:03

## 2022-06-30 RX ADMIN — IPRATROPIUM BROMIDE AND ALBUTEROL SULFATE 1 AMPULE: 2.5; .5 SOLUTION RESPIRATORY (INHALATION) at 10:40

## 2022-06-30 RX ADMIN — POTASSIUM CHLORIDE 20 MEQ: 1500 TABLET, EXTENDED RELEASE ORAL at 09:06

## 2022-06-30 RX ADMIN — CARVEDILOL 3.12 MG: 6.25 TABLET, FILM COATED ORAL at 09:02

## 2022-06-30 RX ADMIN — POTASSIUM CHLORIDE 20 MEQ: 1500 TABLET, EXTENDED RELEASE ORAL at 17:28

## 2022-06-30 RX ADMIN — SACUBITRIL AND VALSARTAN 1 TABLET: 24; 26 TABLET, FILM COATED ORAL at 22:06

## 2022-06-30 RX ADMIN — HYDROCODONE BITARTRATE AND ACETAMINOPHEN 1 TABLET: 7.5; 325 TABLET ORAL at 22:06

## 2022-06-30 RX ADMIN — SPIRONOLACTONE 25 MG: 25 TABLET ORAL at 17:28

## 2022-06-30 RX ADMIN — IPRATROPIUM BROMIDE AND ALBUTEROL SULFATE 1 AMPULE: 2.5; .5 SOLUTION RESPIRATORY (INHALATION) at 07:22

## 2022-06-30 RX ADMIN — FUROSEMIDE 40 MG: 10 INJECTION, SOLUTION INTRAMUSCULAR; INTRAVENOUS at 17:28

## 2022-06-30 ASSESSMENT — PAIN SCALES - GENERAL
PAINLEVEL_OUTOF10: 5
PAINLEVEL_OUTOF10: 6

## 2022-06-30 ASSESSMENT — PAIN - FUNCTIONAL ASSESSMENT: PAIN_FUNCTIONAL_ASSESSMENT: PREVENTS OR INTERFERES SOME ACTIVE ACTIVITIES AND ADLS

## 2022-06-30 ASSESSMENT — PAIN DESCRIPTION - PAIN TYPE: TYPE: SURGICAL PAIN

## 2022-06-30 ASSESSMENT — PAIN DESCRIPTION - DESCRIPTORS
DESCRIPTORS: ACHING
DESCRIPTORS: ACHING

## 2022-06-30 ASSESSMENT — PAIN DESCRIPTION - ONSET: ONSET: ON-GOING

## 2022-06-30 ASSESSMENT — PAIN DESCRIPTION - LOCATION
LOCATION: KNEE
LOCATION: KNEE

## 2022-06-30 ASSESSMENT — PAIN DESCRIPTION - ORIENTATION
ORIENTATION: RIGHT
ORIENTATION: RIGHT

## 2022-06-30 ASSESSMENT — PAIN DESCRIPTION - FREQUENCY: FREQUENCY: INTERMITTENT

## 2022-06-30 NOTE — CONSULTS
Nutrition Assessment     Type and Reason for Visit: Initial,Consult    Nutrition Recommendations/Plan:   1. Will monitor for education needs. 2. Monitor for diet advance. Malnutrition Assessment:  Malnutrition Status: At risk for malnutrition (Comment)    Nutrition Assessment:  Consult for CHF diet education recieved. Pt currently NPO for heart cath. Wt hx stable and no significant wt change identified. Will monitor for diet advance and attempt education at f/u. Nutrition Related Findings:   BUN 25;; NP >4,000 Wound Type: None    Current Nutrition Therapies:    Diet NPO Exceptions are: Sips of Water with Meds    Anthropometric Measures:  · Height: 5' 10\" (177.8 cm)  · Current Body Wt: 243 lb (110.2 kg)   · BMI: 34.9    Nutrition Diagnosis:   · Altered nutrition-related lab values related to cardiac dysfunction as evidenced by lab values    Nutrition Interventions:   Food and/or Nutrient Delivery: Start Oral Diet (As medically appropriate)  Nutrition Education/Counseling: Education needed  Coordination of Nutrition Care: Continue to monitor while inpatient       Goals:     Goals: Initiate PO diet,within 2 days       Nutrition Monitoring and Evaluation:      Food/Nutrient Intake Outcomes: Diet Advancement/Tolerance  Physical Signs/Symptoms Outcomes: Biochemical Data,Nutrition Focused Physical Findings,Weight    Discharge Planning:     Too soon to determine     Malinda Baker, MS, RD, LD  Contact: [phone]

## 2022-06-30 NOTE — PROGRESS NOTES
Daily Progress Note    Date:2022  Patient: Daniel Victor  : 1975  JZB:[id number]  CODE:Full Code No additional code details  PCP:ROSCOE Mejia CNP    Admit Date: 2022  9:43 AM   LOS: 1 day       CC: Shortness of breath      Subjective: This a.m. dyspnea significantly improved with excellent urine output with IV diuretic. Denies any orthopnea, PND or leg swelling. Denies any chest pain or palpitations. No cough, fevers or chills.       Review of Systems    14 point review of systems completed and is negative except as otherwise noted      Objective:      Vital signs in last 24 hours:  Patient Vitals for the past 24 hrs:   BP Temp Temp src Pulse Resp SpO2   22 1453 -- -- -- -- -- 95 %   22 1401 (!) 98/55 97.2 °F (36.2 °C) Temporal 70 20 96 %   22 1325 116/62 -- Temporal 65 20 97 %   22 0933 -- -- -- -- 18 --   22 0903 -- -- -- -- 18 --   22 0726 -- -- -- -- -- 98 %   22 0601 116/76 97 °F (36.1 °C) -- 76 18 97 %   22 0011 (!) 108/57 97 °F (36.1 °C) -- 83 18 93 %   22 1753 111/80 98 °F (36.7 °C) Temporal 99 16 93 %     Physical exam    General: Resting in no acute distress  Psych: Calm and cooperative, good insight and judgment  HEENT: NC/AT, no scleral icterus  Cardiovascular: Normal rate, regular rhythm, pulses equal bilaterally  Respiratory: Diminished secondary to body habitus, no appreciable crackles, no wheezes  Abdomen: Soft, nontender, bowel sounds present  Neurologic: Alert, follows commands, nonfocal  Musculoskeletal: No deformities appreciated  Extremities: Trace LE edema, no clubbing or cyanosis  Skin: Warm and dry, well perfused      Lab Review   Recent Results (from the past 24 hour(s))   Urinalysis with Reflex to Culture    Collection Time: 22  5:30 PM    Specimen: Urine   Result Value Ref Range    Color, UA YELLOW Straw/Yellow    Clarity, UA Clear Clear    Glucose, Ur Negative Negative mg/dL    Bilirubin Urine Negative Negative    Ketones, Urine Negative Negative mg/dL    Specific Gravity, UA 1.016 1.005 - 1.030    Blood, Urine Negative Negative    pH, UA 8.0 5.0 - 8.0    Protein, UA Negative Negative mg/dL    Urobilinogen, Urine 0.2 <2.0 E.U./dL    Nitrite, Urine Negative Negative    Leukocyte Esterase, Urine Negative Negative   Lactic Acid    Collection Time: 06/30/22  5:23 AM   Result Value Ref Range    Lactic Acid 1.6 0.5 - 1.9 mmol/L   Basic Metabolic Panel    Collection Time: 06/30/22  5:23 AM   Result Value Ref Range    Sodium 144 136 - 145 mmol/L    Potassium 3.6 3.5 - 5.0 mmol/L    Chloride 102 98 - 111 mmol/L    CO2 29 22 - 29 mmol/L    Anion Gap 13 7 - 19 mmol/L    Glucose 84 74 - 109 mg/dL    BUN 25 (H) 6 - 20 mg/dL    CREATININE 0.9 0.5 - 1.2 mg/dL    GFR Non-African American >60 >60    GFR African American >59 >59    Calcium 9.2 8.6 - 10.0 mg/dL   Magnesium    Collection Time: 06/30/22  5:23 AM   Result Value Ref Range    Magnesium 2.3 1.6 - 2.6 mg/dL   Lipid panel - fasting    Collection Time: 06/30/22  5:23 AM   Result Value Ref Range    Cholesterol, Total 174 160 - 199 mg/dL    Triglycerides 217 (H) 0 - 149 mg/dL    HDL 56 55 - 121 mg/dL    LDL Calculated 75 <100 mg/dL   CBC    Collection Time: 06/30/22  5:23 AM   Result Value Ref Range    WBC 13.5 (H) 4.8 - 10.8 K/uL    RBC 4.93 4.70 - 6.10 M/uL    Hemoglobin 14.1 14.0 - 18.0 g/dL    Hematocrit 44.3 42.0 - 52.0 %    MCV 89.9 80.0 - 94.0 fL    MCH 28.6 27.0 - 31.0 pg    MCHC 31.8 (L) 33.0 - 37.0 g/dL    RDW 13.2 11.5 - 14.5 %    Platelets 907 818 - 045 K/uL    MPV 9.9 9.4 - 12.4 fL       I/O last 3 completed shifts:  In: -   Out: 1100 [Urine:1100]  I/O this shift:   In: 0   Out: 900 [Urine:900]      Current Facility-Administered Medications:     potassium chloride (KLOR-CON M) extended release tablet 20 mEq, 20 mEq, Oral, BID WC, Crystal Reynoso MD, 20 mEq at 06/30/22 0906    ondansetron (ZOFRAN-ODT) disintegrating tablet 4 mg, 4 mg, Oral, Q8H PRN **OR** mL, 5-40 mL, IntraVENous, 2 times per day, Reema Toribio MD, 10 mL at 06/30/22 7867    sodium chloride flush 0.9 % injection 5-40 mL, 5-40 mL, IntraVENous, PRN, Reema Toribio MD    0.9 % sodium chloride infusion, , IntraVENous, PRN, Reema Toribio MD    [Held by provider] enoxaparin Sodium (LOVENOX) injection 30 mg, 30 mg, SubCUTAneous, BID, ROSCOE Mccain - CNP          Assessment/plan  Principal Problem:    Acute combined systolic (congestive) and diastolic (congestive) heart failure (HCC)  Active Problems:    Atypical pneumonia    Nonischemic cardiomyopathy ([facility] [address]  Resolved Problems:    * No resolved hospital problems. *    Summary: 24-year-old male with obesity, depression, gout, family history of cardiac disease, fairly recent history of right total knee replacement approximately 7 weeks ago, presented as transfer from [facility] after presenting there with progressive worsening dyspnea, transfer here for further evaluation of decompensated heart failure, new onset. Chest x-ray shows signs of pulmonary edema and noted to have significantly elevated proBNP. COVID and respiratory PCR otherwise negative, negative procalcitonin. At outside facility CTA was negative for PE. He was noted to have significant leukocytosis, although patient did receive steroids at outside facility prior to transfer, likely secondary to this. No clear evidence of infection and WBC down trended. Patient was managed with IV diuresis with improvement in symptoms and excellent urine output. Evaluated by cardiology and underwent ischemic evaluation with cardiac catheterization revealing insignificant CAD, but concern for nonischemic dilated cardiomyopathy with moderately elevated LV end-diastolic pressure and echo with EF 35% and grade 3 diastolic dysfunction. Initiated on [place], Coreg and Aldactone.       Acute systolic and diastolic heart failure with nonischemic cardiomyopathy  - Cardiology on board, cardiac catheterization results reviewed today  - Echo with EF 35% and grade 3 diastolic dysfunction  - Continue diuresis with IV Lasix, can likely transition to oral loop diuretic tomorrow given continued good response  - Start Entresto, Coreg, Aldactone with close monitoring of BP, HR and renal function    - K repleted, normal magnesium, monitor electrolytes    Reviewed thyroid function testing, appropriate    Leukocytosis, recent steroids from outside facility and reactive  No clear evidence of infection  - WBC downtrending, no clear indication for antibiotics    Monitor telemetry    Patient to be set up with LifeVest prior to discharge and arrange outpatient cardiology follow-up        Smiley Ren MD 6/30/2022 4:54 PM

## 2022-06-30 NOTE — PROCEDURES
Cardiac Catherization        Patient Name: Yuni Davies   MRN: [id number]   Age: 49 y.o. : 1975   Admission Date: 2022   Room/Bed: [address] [place] [address]   PRIMARY CARE PROVIDER   Yoni Fry APRN - [facility]     Franck Pineda MD        DATE OF PROCEDURE: 2022   INDICATIONS:   This is a 49 y.o. male has had dilated cardiomyopathy and congestive heart failure    PROCEDURE:   1. Coronary Angiogram   2. Left heart catheterization          ANESTHESIA   Moderate sedation. The patient was continously monitored by the trained experienced nurse under my supervision with respect to level of  consciousness, and vital signs/physiologic status throughout the case. For further details regarding specific medications and doses please refer to  cath lab procedural notes    DESCRIPTION OF PROCEDURE:   Yuni Davies was brought to the cath lab in a fasting state after informed consent was obtained. A pre procedural time out occurred to identify correct patient and procedure. Patient was prepped and draped in the usual sterile fashion. Right radial approach was used. Bleeding was stopped with TR band  Routine use of diagnostic catheters were used for angiogram and different projections. FINDINGS:   HEMODYNAMICS:   LVEDP: 22 mmHg   BP: 100/70  HR: 80 bpm    Comments: Moderately elevated left ventricular end-diastolic pressure    CORONARY ANGIOGRAPHY:   Dominance: Left  Left Main: Normal  LAD: Left anterior descending was large in caliber and wraps around the apex for some distance. It was free of significant disease. Diagonal branch was normal in caliber and free of significant disease. Ramus was normal sized vessel and free of significant disease  LCx: Left circumflex was a large-caliber vessel and free of significant disease. It also provided a large posterior descending which is free of significant disease  RCA: Right coronary artery was nondominant vessel.   There was a catheter induced spasm at the ostium and proximal portion. This resolved with different catheter. ESTIMATED BLOOD LOSS: Minimal  COMPLICATIONS: There was transient ventricular fibrillation during injections of the left system. This was successfully defibrillated with no consequences        CONCLUSIONS:   Insignificant coronary artery disease  Nonischemic dilated cardiomyopathy  Moderately elevated left ventricular end-diastolic pressure    RECOMMENDATIONS:   Medical therapy using Entresto, MRA, beta-blocker, Demadex. LifeVest to be installed before discharge    This dictation was performed using [address]. Mistake and misspelling may have been created without  realizing them. Please notify me for clarification.   Thank you  Moe Palacio MD  6/30/2022, 4:41 PM

## 2022-06-30 NOTE — PROGRESS NOTES
Automatic Dose Adjustment of                Subcutaneous Anticoagulant for Prophylaxis    Jeremi Pappas is a 49 y.o. male. Recent Labs     06/29/22  1022   CREATININE 0.8       Estimated Creatinine Clearance: 140 mL/min (based on SCr of 0.8 mg/dL). Weight:  Wt Readings from Last 1 Encounters:   06/29/22 243 lb (110.2 kg)           Pharmacy has adjusted subcutaneous anticoagulant for prophylaxis to Enoxaparin 30 mg SC twice daily based on the patient's weight and estimated CrCl per [facility] policy.                Electronically signed by BRANDI Nguyen [facility] on 6/29/2022 at 7:27 PM

## 2022-06-30 NOTE — CONSULTS
Nurse met with patient and wife re: referral to CHF clinic. 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 Use to work here at [employer]. Discussed with patient diet, activity, medications, definition of heart failure and s/s, daily wt monitoring with reporting of wt gain of >2-3 lbs in 24 hours or > 5 lbs in one week, BP/HR and activity monitoring with use of daily log, f/u appointments with PCP, CHF clinic and cardiologist. Discussed vaccine use, testing. HF packet given. He reports being familiar with management of heart issues but that he is hoping this is just from a virus and will pass. Discussion that during this time and even if EF returns to normal these are all good plans for heart health. He and wife did agree. Patient stated he does have a wt scale and BP monitor at home. Encouraged importance of  f/u appointments. Time spent with patient 30 minutes.

## 2022-06-30 NOTE — CARE COORDINATION
Patient Contact Information:  [address] [place] [address] [phone] (home)   Above information verified? [x]   Yes  []   No    Had HOME OXYGEN prior to admission:  No     Pharmacy:    [address], [address] [place] [address] [place] [address]  Phone: [phone] Fax: [phone]      Active with HD/PD prior to admission:           []   Yes  [x]   No  HD Center:       Financial:  Payor: Jamilah Christiansen / Plan: Jason Cerda / Product Type: *No Product type* /     Pre-Cert required for SNF:   [x]   Yes  []   No    Patient Deficits:  []   Yes   [x]   No    If yes:  []   Confusion/Memory  []   Visual  []   Motor/Sensory         []   Right arm         []   Right leg         []   Left arm         []   Left leg  []   Language/Speech         []   Aphasia         []   Dysarthria         []   Swallow         Cristina Coma Scale  Eye Opening: Spontaneous  Best Verbal Response: Oriented  Best Motor Response: Obeys commands  Cristina Coma Scale Score: 15    Patient Deficit Notes: Additional CM/SW Notes:       [address] Management       06/30/22 0993   Service Assessment   Patient Orientation Alert and Oriented   Cognition Alert   History Provided By Patient   Primary Caregiver Self   Support Systems Spouse/Significant Other   Patient's Healthcare Decision Maker is: Legal Next of Kin   PCP Verified by CM Yes   Prior Functional Level Independent in ADLs/IADLs   Current Functional Level Independent in ADLs/IADLs   Can patient return to prior living arrangement Yes   Ability to make needs known: Good   Family able to assist with home care needs: Yes   Would you like for me to discuss the discharge plan with any other family members/significant others, and if so, who?  No   Social/Functional History   Lives With Spouse   Type of Home House   Receives Help From Family;Friend(s)   ADL Assistance Independent   Homemaking Assistance Independent   Ambulation Assistance Independent   Transfer Assistance Independent   Active  Yes   Occupation Full time employment   Discharge Planning   Type of [address] Prior To Admission None   Potential Assistance Needed N/A   DME Ordered? No   Potential Assistance Purchasing Medications No   Type of Home Care Services None   Patient expects to be discharged to: House   History of falls? 0   Services At/After Discharge   Mode of Transport at Discharge Self   Condition of Participation: Discharge Planning   The Patient and/or Patient Representative was provided with a Choice of Provider? Patient   The Patient and/Or Patient Representative agree with the Discharge Plan?  Yes

## 2022-07-01 VITALS
OXYGEN SATURATION: 98 % | WEIGHT: 243 LBS | DIASTOLIC BLOOD PRESSURE: 72 MMHG | HEIGHT: 70 IN | BODY MASS INDEX: 34.79 KG/M2 | SYSTOLIC BLOOD PRESSURE: 98 MMHG | HEART RATE: 95 BPM | RESPIRATION RATE: 20 BRPM | TEMPERATURE: 97.7 F

## 2022-07-01 LAB
ANION GAP SERPL CALCULATED.3IONS-SCNC: 14 MMOL/L (ref 7–19)
BUN BLDV-MCNC: 25 MG/DL (ref 6–20)
CALCIUM SERPL-MCNC: 9.2 MG/DL (ref 8.6–10)
CHLORIDE BLD-SCNC: 99 MMOL/L (ref 98–111)
CO2: 27 MMOL/L (ref 22–29)
CREAT SERPL-MCNC: 1 MG/DL (ref 0.5–1.2)
EKG P AXIS: 48 DEGREES
EKG P-R INTERVAL: 128 MS
EKG Q-T INTERVAL: 422 MS
EKG QRS DURATION: 124 MS
EKG QTC CALCULATION (BAZETT): 455 MS
EKG T AXIS: -36 DEGREES
GFR AFRICAN AMERICAN: >59
GFR NON-AFRICAN AMERICAN: >60
GLUCOSE BLD-MCNC: 90 MG/DL (ref 74–109)
HCT VFR BLD CALC: 47.1 % (ref 42–52)
HEMOGLOBIN: 15.3 G/DL (ref 14–18)
MAGNESIUM: 2.4 MG/DL (ref 1.6–2.6)
MCH RBC QN AUTO: 28.9 PG (ref 27–31)
MCHC RBC AUTO-ENTMCNC: 32.5 G/DL (ref 33–37)
MCV RBC AUTO: 89 FL (ref 80–94)
PDW BLD-RTO: 13.1 % (ref 11.5–14.5)
PLATELET # BLD: 349 K/UL (ref 130–400)
PMV BLD AUTO: 10 FL (ref 9.4–12.4)
POTASSIUM SERPL-SCNC: 4.1 MMOL/L (ref 3.5–5)
RBC # BLD: 5.29 M/UL (ref 4.7–6.1)
SODIUM BLD-SCNC: 140 MMOL/L (ref 136–145)
WBC # BLD: 12.1 K/UL (ref 4.8–10.8)

## 2022-07-01 PROCEDURE — 6370000000 HC RX 637 (ALT 250 FOR IP): Performed by: STUDENT IN AN ORGANIZED HEALTH CARE EDUCATION/TRAINING PROGRAM

## 2022-07-01 PROCEDURE — 6370000000 HC RX 637 (ALT 250 FOR IP): Performed by: INTERNAL MEDICINE

## 2022-07-01 PROCEDURE — 2580000003 HC RX 258: Performed by: INTERNAL MEDICINE

## 2022-07-01 PROCEDURE — 83735 ASSAY OF MAGNESIUM: CPT

## 2022-07-01 PROCEDURE — 6370000000 HC RX 637 (ALT 250 FOR IP)

## 2022-07-01 PROCEDURE — 93005 ELECTROCARDIOGRAM TRACING: CPT

## 2022-07-01 PROCEDURE — 80048 BASIC METABOLIC PNL TOTAL CA: CPT

## 2022-07-01 PROCEDURE — 36415 COLL VENOUS BLD VENIPUNCTURE: CPT

## 2022-07-01 PROCEDURE — 99232 SBSQ HOSP IP/OBS MODERATE 35: CPT | Performed by: INTERNAL MEDICINE

## 2022-07-01 PROCEDURE — 85027 COMPLETE CBC AUTOMATED: CPT

## 2022-07-01 RX ORDER — SPIRONOLACTONE 25 MG/1
25 TABLET ORAL 2 TIMES DAILY
Qty: 60 TABLET | Refills: 1 | Status: SHIPPED | OUTPATIENT
Start: 2022-07-01 | End: 2022-07-25 | Stop reason: SDUPTHER

## 2022-07-01 RX ORDER — TORSEMIDE 20 MG/1
20 TABLET ORAL DAILY
Qty: 30 TABLET | Refills: 1 | Status: SHIPPED | OUTPATIENT
Start: 2022-07-02 | End: 2022-07-25 | Stop reason: SDUPTHER

## 2022-07-01 RX ORDER — CARVEDILOL 3.12 MG/1
3.12 TABLET ORAL 2 TIMES DAILY WITH MEALS
Qty: 60 TABLET | Refills: 1 | Status: SHIPPED | OUTPATIENT
Start: 2022-07-01 | End: 2022-07-25 | Stop reason: SDUPTHER

## 2022-07-01 RX ORDER — POTASSIUM CHLORIDE 750 MG/1
10 TABLET, EXTENDED RELEASE ORAL DAILY
Qty: 30 TABLET | Refills: 0 | Status: SHIPPED | OUTPATIENT
Start: 2022-07-01 | End: 2022-07-25 | Stop reason: SDUPTHER

## 2022-07-01 RX ADMIN — TORSEMIDE 20 MG: 20 TABLET ORAL at 07:48

## 2022-07-01 RX ADMIN — SODIUM CHLORIDE, PRESERVATIVE FREE 10 ML: 5 INJECTION INTRAVENOUS at 07:49

## 2022-07-01 RX ADMIN — CARVEDILOL 3.12 MG: 6.25 TABLET, FILM COATED ORAL at 07:48

## 2022-07-01 RX ADMIN — HYDROCODONE BITARTRATE AND ACETAMINOPHEN 1 TABLET: 7.5; 325 TABLET ORAL at 07:51

## 2022-07-01 RX ADMIN — SACUBITRIL AND VALSARTAN 1 TABLET: 24; 26 TABLET, FILM COATED ORAL at 07:48

## 2022-07-01 RX ADMIN — SPIRONOLACTONE 25 MG: 25 TABLET ORAL at 07:48

## 2022-07-01 RX ADMIN — PANTOPRAZOLE SODIUM 40 MG: 40 TABLET, DELAYED RELEASE ORAL at 06:20

## 2022-07-01 RX ADMIN — BUPROPION HYDROCHLORIDE 150 MG: 150 TABLET, EXTENDED RELEASE ORAL at 07:48

## 2022-07-01 ASSESSMENT — PAIN - FUNCTIONAL ASSESSMENT: PAIN_FUNCTIONAL_ASSESSMENT: PREVENTS OR INTERFERES SOME ACTIVE ACTIVITIES AND ADLS

## 2022-07-01 ASSESSMENT — PAIN DESCRIPTION - DESCRIPTORS
DESCRIPTORS: ACHING
DESCRIPTORS: ACHING

## 2022-07-01 ASSESSMENT — PAIN DESCRIPTION - LOCATION
LOCATION: KNEE
LOCATION: KNEE

## 2022-07-01 ASSESSMENT — PAIN DESCRIPTION - ORIENTATION
ORIENTATION: RIGHT
ORIENTATION: RIGHT

## 2022-07-01 ASSESSMENT — PAIN SCALES - GENERAL
PAINLEVEL_OUTOF10: 5
PAINLEVEL_OUTOF10: 5

## 2022-07-01 ASSESSMENT — PAIN DESCRIPTION - PAIN TYPE: TYPE: SURGICAL PAIN

## 2022-07-01 NOTE — DISCHARGE SUMMARY
Discharge Summary    NAME: Javon Tejeda  :  1975  MRN:  [id number]    Admit date:  2022  Discharge date:  2022    Advance Directive: Full Code    Consults: cardiology    Primary Care Physician:  ROSCOE Contreras - CNP    Discharge Diagnoses:  Principal Problem:    Acute combined systolic (congestive) and diastolic (congestive) heart failure (HCC)  Active Problems:    Nonischemic cardiomyopathy ([address]        Significant Diagnostic Studies:   ECHO Complete 2D W Doppler W Color    Result Date: 2022  Transthoracic Echocardiography Report (TTE)  Demographics   Patient Name    Mancil Scriver    Date of Study              2022   MRN             [id number]         Gender                     Male   Date of Birth   1975     Room Number                L-0424   Age             49 year(s)   Height:         79 inches      Referring Physician        Ritu Contreras MD   Weight:         243.01 pounds  Sonographer                Nga Rooney   BSA:            2.48 m^2       Interpreting Physician     Ritu Contreras MD   BMI:            27.38 kg/m^2  Procedure Type of Study   TTE procedure:ECHO NO CONTRAST WITH DOP/COLR. Study Location: Echo Lab Technical Quality: Limited visualization due to poor acoustical window. Patient Status: Inpatient Contrast Medium: Definity. HR: 99 bpm BP: 125/78 mmHg Indications:Congestive heart failure.   Conclusions   Summary  Left ventricle is dilated with normal thickness  Moderately impaired left ventricular systolic function with estimated  ejection fraction of 96%  Grade 3 diastolic dysfunction  Hypokineses especially in the septum, apical and inferior wall  Mild to moderate mitral regurgitation  Mild tricuspid insufficiency with estimated right ventricular systolic  pressure of 57 mm which is moderately elevated  IVC does not have respiratory variation suggestive of increased right  atrial filling pressure  Trivial aortic insufficiency  Moderately dilated left atrium   Signature gradient: 3 mmHg  TR Velocity:244 cm/s                 MV P1/2t: 68 msec  TR Gradient:23.81 mmHg               MVA by PHT3.24 cm^2  Estimated RAP:3 mmHg  RVSP:36 mmHg      XR CHEST PORTABLE    Result Date: 6/29/2022  NO PRIOR REPORT AVAILABLE [facility] Clinical data:Shortness of breath. Technique:Single view of the chest. Prior studies: No prior studies submitted. Findings:Mild cardiomegaly, hilar congestion and perihilar airspace densities possibly edema is noted. No pleural effusion. Cardiac silhouette is within normal limits. No acute osseous abnormality is detected. Questionable CHF versus perihilar infiltrates as described. Recommendation: Follow up as clinically indicated. Electronically Signed by Lula Browning MD at 29-Jun-2022 11:13:12 AM               Pertinent Labs:   CBC:   Recent Labs     06/29/22  1022 06/30/22  0523 07/01/22  0445   WBC 24.6* 13.5* 12.1*   HGB 13.9* 14.1 15.3    312 349     BMP:    Recent Labs     06/29/22  1022 06/30/22  0523 07/01/22  0445   * 144 140   K 3.6 3.6 4.1    102 99   CO2 26 29 27   BUN 21* 25* 25*   CREATININE 0.8 0.9 1.0   GLUCOSE 95 84 90     INR: No results for input(s): INR in the last 72 hours. Lipids:   Recent Labs     06/30/22  0523   CHOL 174   HDL 56           Hospital Course:   69-year-old male with obesity, depression, gout, family history of cardiac disease, fairly recent history of right total knee replacement approximately 7 weeks ago, presented as transfer from [facility] after presenting there with progressive worsening dyspnea, transfer here for further evaluation of decompensated heart failure, new onset. Chest x-ray showed signs of pulmonary edema and noted to have significantly elevated proBNP. COVID and respiratory PCR otherwise negative, negative procalcitonin. At outside facility CTA was negative for PE.   He was noted to have significant leukocytosis, although patient did receive steroids at outside facility prior to transfer, likely secondary to this. No clear evidence of infection and WBC down trended. Patient was managed with IV diuresis with improvement in symptoms and excellent urine output. Evaluated by cardiology and underwent ischemic evaluation with cardiac catheterization revealing insignificant CAD, but concern for nonischemic dilated cardiomyopathy with moderately elevated LV end-diastolic pressure and echo with EF 35% and grade 3 diastolic dysfunction. Initiated on [place], Coreg, Aldactone, and torsemide which he tolerated without issue. Recommended LifeVest, which was set up. Patient's insurance required additional 72-hours for approval, LifeVest to be delivered to patient at home.   Medically stable for discharge on new heart failure medication regimen with instructions to follow-up with PCP within 1 week and outpatient follow-up with cardiology.           Physical Exam:  Vital Signs: /83   Pulse 85   Temp 99 °F (37.2 °C) (Temporal)   Resp 18   Ht 5' 10\" (1.778 m)   Wt 243 lb (110.2 kg)   SpO2 92%   BMI 34.87 kg/m²   General: Resting in no acute distress  Psych: Calm and cooperative, good insight and judgment  HEENT: NC/AT, no scleral icterus  Cardiovascular: Normal rate, regular rhythm, pulses equal bilaterally  Respiratory: Diminished secondary to body habitus, no appreciable crackles, no wheezes  Abdomen: Soft, nontender, bowel sounds present  Extremities: Trace LE edema, no clubbing or cyanosis  Skin: Warm and dry, well perfused       Discharge Medications:         Medication List      START taking these medications    carvedilol 3.125 MG tablet  Commonly known as: COREG  Take 1 tablet by mouth 2 times daily (with meals)     sacubitril-valsartan 24-26 MG per tablet  Commonly known as: ENTRESTO  Take 1 tablet by mouth 2 times daily     spironolactone 25 MG tablet  Commonly known as: ALDACTONE  Take 1 tablet by mouth 2 times daily     torsemide 20 MG tablet  Commonly known

## 2022-07-01 NOTE — PROGRESS NOTES
Cardiology Progress Note   Chadd Solano MD      Patient:    Emperatriz Cifuentes  [id number]  1975    Patient Active Problem List    Diagnosis Date Noted    Nonischemic cardiomyopathy ([facility] 06/30/2022     Priority: Medium    Acute combined systolic (congestive) and diastolic (congestive) heart failure ([facility] 06/29/2022     Priority: Medium       Admit Date:  6/29/2022    Admission Problem List: Present on Admission:   Acute combined systolic (congestive) and diastolic (congestive) heart failure (HCC)   Nonischemic cardiomyopathy ([facility]       Cardiac Specific Data:  Specialty Problems        Cardiology Problems    * (Principal) Acute combined systolic (congestive) and diastolic (congestive) heart failure (HCC)        Nonischemic cardiomyopathy (HCC)              Subjective:  Patient had nonischemic dilated cardiomyopathy. So far he has tolerated Entresto, MRA, beta-blocker and Demadex. He ambulated on the floor with no difficulty. Objective:   BP 98/72   Pulse 95   Temp 97.7 °F (36.5 °C) (Temporal)   Resp 20   Ht 5' 10\" (1.778 m)   Wt 243 lb (110.2 kg)   SpO2 98%   BMI 34.87 kg/m²       Intake/Output Summary (Last 24 hours) at 7/1/2022 1644  Last data filed at 7/1/2022 5452  Gross per 24 hour   Intake 120 ml   Output 350 ml   Net -230 ml       No current facility-administered medications for this encounter.      Current Outpatient Medications   Medication Sig Dispense Refill    carvedilol (COREG) 3.125 MG tablet Take 1 tablet by mouth 2 times daily (with meals) 60 tablet 1    sacubitril-valsartan (ENTRESTO) 24-26 MG per tablet Take 1 tablet by mouth 2 times daily 60 tablet 1    spironolactone (ALDACTONE) 25 MG tablet Take 1 tablet by mouth 2 times daily 60 tablet 1    [START ON 7/2/2022] torsemide (DEMADEX) 20 MG tablet Take 1 tablet by mouth daily 30 tablet 1    potassium chloride (KLOR-CON M) 10 MEQ extended release tablet Take 1 tablet by mouth daily 30 tablet 0    buPROPion (WELLBUTRIN XL) 150 MG extended release tablet Take 150 mg by mouth daily       ergocalciferol (ERGOCALCIFEROL) 1.25 MG (72939 UT) capsule once a week       acyclovir (ZOVIRAX) 800 MG tablet       HYDROcodone-acetaminophen (NORCO) 7.5-325 MG per tablet Take 1 tablet by mouth every 6 hours as needed for Pain.  omeprazole (PRILOSEC) 40 MG delayed release capsule 40 mg daily       Armodafinil 250 MG TABS daily as needed. Physical Exam:  General: NAD, alert  HEENT: normal  CHEST: clear to ascultation  CVS: S1 and S2 normal, no murmur, no JVD  ABD; nontender, bowel sounds normal, liver and spleen not palpable  MSK: normal  CNS: oriented X3, normal affect, normal CN  PVD:  all peripheral pulses normal, no swelling of the ankles      RECENT LABS:    Lab Data:  CBC:   Recent Labs     06/29/22  1022 06/30/22  0523 07/01/22  0445   WBC 24.6* 13.5* 12.1*   HGB 13.9* 14.1 15.3   HCT 43.4 44.3 47.1   MCV 88.9 89.9 89.0    312 349     BMP:   Recent Labs     06/29/22  1022 06/30/22  0523 07/01/22  0445   * 144 140   K 3.6 3.6 4.1    102 99   CO2 26 29 27   BUN 21* 25* 25*   CREATININE 0.8 0.9 1.0     LIVER PROFILE:   Recent Labs     06/29/22  1022   AST 22   ALT 20   BILITOT <0.2   ALKPHOS 77     PT/INR: No results for input(s): PROTIME, INR in the last 72 hours. APTT: No results for input(s): APTT in the last 72 hours. CK, CKMB, Troponin:   Recent Labs     06/29/22  1022   TROPONINI <0.01       Last 3 BNP:  Recent Labs     06/29/22  1022   PROBNP 4,483*       IMAGING:  XR CHEST PORTABLE    Result Date: 6/29/2022  Questionable CHF versus perihilar infiltrates as described. Recommendation: Follow up as clinically indicated.  Electronically Signed by Oumar Vann MD at 29-Jun-2022 11:13:12 AM                  Assessment and Plan:    Nonischemic dilated cardiomyopathy, acute systolic heart failure now resolved  Etiologies of the cardiomyopathy is unknown    Plan: He has been offered LifeVest for prevention of sudden death. 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 He and his wife fully realized that there may be a small chance of sudden death without it. They are taking the risk. I will see the patient in 3 weeks. Patient should have blood work done before seeing me. I have spent 30 minutes reviewing the chart, taking history, examining the patient, discussion with the patient and the family concerning the finding, diagnoses and treatment plan. This dictation was performed using [address]. Mistake and misspelling may have been created without  realizing them. Please notify me for clarification.   Thank you    Wendy Krishna MD  7/1/2022 4:44 PM

## 2022-07-01 NOTE — CARE COORDINATION
Informed Virginia Casey, with [facility], that patient will be discharged today. Patient will be fitted for Life Vest at his home next week pending insurance approval.  Informed patient of plan.   Electronically signed by Leann Bedoya RN on 7/1/2022 at 11:44 AM

## 2022-07-01 NOTE — CARE COORDINATION
Referral for Life Vest faxed to Robb at [place]. Awaiting approval and fitting. When approved by insurance a [employer] rep will come to room to fit for Life Vest prior to discharge.   [employer] Intake   P: [phone]  F: [phone]     [facility] cell [phone] or [facility] cell [phone]  Electronically signed by Koreen Runner, RN on 7/1/2022 at 9:28 AM

## 2022-07-01 NOTE — PROGRESS NOTES
Physician Progress Note      Linda MARTIN  CSN #:                  [id number]  :                       1975  ADMIT DATE:       2022 9:43 AM  DISCH DATE:  RESPONDING  PROVIDER #:        Karlo Lebron          QUERY TEXT:    Patient admitted with CHF. Noted documentation of pneumonia in H/P dated   22 and subsequent progress notes and \"no clear evidence of infection\" in   progress notes dated 22. If possible, please document in progress notes and discharge summary if you   are evaluating and /or treating any of the following: The medical record reflects the following:  Risk Factors: recent surgery, CHF  Clinical Indicators: presented with shortness of breath, noted dry cough. Treatment at outside facility included steroids and IV abx, WBC 26.4,   CXR-questionable CHF vs perihilar infiltrates as described;  Treatment: Rocephin IV    Thank you,  Tal Ortega RN, CCDS  [phone]  Options provided:  -- Pneumonia confirmed  -- Pneumonia ruled out  -- Other - I will add my own diagnosis  -- Disagree - Not applicable / Not valid  -- Disagree - Clinically unable to determine / Unknown  -- Refer to Clinical Documentation Reviewer    PROVIDER RESPONSE TEXT:    After study, pneumonia ruled out.     Query created by: Baldev Delgado on 2022 10:50 AM      Electronically signed by:  Karlo Lebron 2022 11:19 AM

## 2022-07-01 NOTE — CARE COORDINATION
[place], [address] [phone]              Coverage Information (for Hospital Account [de-identified])    F/O Payor/Plan Precert #   BCBS/BCBS -  [place]    Subscriber Subscriber #   [place] [id number]   Address Phone   [address]   [place] [address] [place] [address] Drive            [address]        CSN                                    Req/Control # [Problem retrieving Specimen ID]                                   Order Date:  2022  [id number]                                          Patient Information      Name:  Tanisha Armstrong  :  1975  Age:  49 y.o. Address:  [address] [place] [address]   Zip:  [address]  PCP: ROSCOE Day - JHONATAN Sex:  Cheryl Murray  SSN: [id number]  Home Phone: [phone]  Work Phone:    Patient MRN:  [id number]    Alt Patient ID:  [id number]  PCP Phone: [phone]       Authorizing Provider Information       AUTHORIZING PROVIDER: Sagar Becerra MD  Physician ID: [id number]  NPI:  [id number]  Site:   Address: [address] [place] [address]  Phone: [phone]  Fax: [phone]             EQUIPMENT ORDERED  DME Order for [address] [[id number]] [facility]   #:   [id number]) Priority  Routine Class  Hospital Performed        Associated Diagnosis:          Comments:   Start Date: 22     Length of need: 3 Months     Reason for device: DCM (including NICM) with an EF of < or = 35%     VT Rate Threshold, BPM (between 120-250, 150 is usual) 150      VF Rate Threshold, BPM (between 120-250, 200 is usual) 200       Treatment Energy:  Joules: (75, 100, 125, 150, 150) 150 Joules        For questions about the Wearable Cardioverter Defibrillator (LifeVest), please contact [facility] Patient Support at [phone] or [email or web address].             Scheduling Instructions:                                 Specimen Source             Collection Date    Collection Time    Order Status    Expected Date                 Electronically Signed By  Mariana Aguirre systolic function with estimated   ejection fraction of 35%   Grade 3 diastolic dysfunction   Hypokineses especially in the septum, apical and inferior wall   Mild to moderate mitral regurgitation   Mild tricuspid insufficiency with estimated right ventricular systolic   pressure of 57 mm which is moderately elevated   IVC does not have respiratory variation suggestive of increased right   atrial filling pressure   Trivial aortic insufficiency   Moderately dilated left atrium      Signature      ----------------------------------------------------------------   Electronically signed by Ariana Christine MD(Interpreting physician)   on 06/29/2022 05:44 PM   ----------------------------------------------------------------      Findings      Mitral Valve   Mild to moderate mitral regurgitation      Aortic Valve   Trivial aortic insufficiency      Tricuspid Valve   Mild tricuspid insufficiency with estimated right ventricular systolic   pressure of 57 mm      Pulmonic Valve   No evidence of any pulmonic regurgitation. Left Atrium   Moderately dilated left atrium      Left Ventricle   See conclusion      Right Atrium   Normal right atrial dimension with no evidence of thrombus or mass noted. Right Ventricle   Normal right ventricular size with preserved RV function. Miscellaneous   Normal-sized IVC with no respiratory variation     2D Measurements and Calculations(cm)      LVIDd: 6.05 cm                      LVIDs: 4.12 cm   IVSd: 0.87 cm   LVPWd: 0.97 cm                      AO Root Dimension: 2.5 cm   Rt. Vent.  Dimension: 2.84 cm        LA Dimension: 3.8 cm   % Ejection Fraction: 35 %           LA Area: 24.5 cm^2   LA Volume: 80.5 ml                  LV Systolic dimension: 3.36FP   LA Volume Index: 32 ml/m^2          LV PW diastolic: 8.28PG   LV dimension: 6.05 cm               LVOT diameter: 2 cm                                       RA Systolic pressure: 3mmHg   LVEDV: 127 ml                       RV Diastolic dimension: 2.84cm   LVESV:109 ml                        LVEDVI: 51 ml/m^2   Cardic Output (CO): 6.56l/min       LVESVI: 44 ml/m^2   Ascending Aorta: 2.8 cm     Doppler Measurements and Calculations      AV Peak Velocity:148 cm/s            MV Peak E-Wave: 95.5 cm/s   AV Mean Velocity:113 cm/s            MV Peak A-Wave: 33.7 cm/s   AV Peak Gradient: 8.76 mmHg          MV E/A Ratio: 2.83 %   AV Mean Gradient: 6 mmHg             MV Mean velocity: 73.9cm/s   AV Area (Continuity):2.66 cm^2       MV Peak Gradient: 3.65 mmHg   AV VTI:24.9 cm/s                     MV Mean gradient: 3 mmHg   TR Velocity:244 cm/s                 MV P1/2t: 68 msec   TR Gradient:23.81 mmHg               MVA by PHT3.24 cm^2   Estimated RAP:3 mmHg   RVSP:36 mmHg                Specimen Collected: 06/29/22 17:06 Last Resulted: 06/29/22 17:44

## 2022-07-01 NOTE — PLAN OF CARE
Problem: Safety - Adult  Goal: Free from fall injury  Outcome: Progressing     Problem: ABCDS Injury Assessment  Goal: Absence of physical injury  Outcome: Progressing     Problem: Pain  Goal: Verbalizes/displays adequate comfort level or baseline comfort level  Outcome: Progressing     Problem: Nutrition Deficit:  Goal: Optimize nutritional status  Outcome: Progressing

## 2022-07-04 LAB
BLOOD CULTURE, ROUTINE: NORMAL
CULTURE, BLOOD 2: NORMAL

## 2022-07-22 ENCOUNTER — TELEPHONE (OUTPATIENT)
Dept: CARDIOLOGY CLINIC | Age: 47
End: 2022-07-22

## 2022-07-25 ENCOUNTER — OFFICE VISIT (OUTPATIENT)
Dept: CARDIOLOGY CLINIC | Age: 47
End: 2022-07-25
Payer: COMMERCIAL

## 2022-07-25 VITALS
WEIGHT: 248 LBS | HEIGHT: 70 IN | SYSTOLIC BLOOD PRESSURE: 96 MMHG | HEART RATE: 88 BPM | BODY MASS INDEX: 35.5 KG/M2 | DIASTOLIC BLOOD PRESSURE: 64 MMHG

## 2022-07-25 DIAGNOSIS — I50.41 ACUTE COMBINED SYSTOLIC (CONGESTIVE) AND DIASTOLIC (CONGESTIVE) HEART FAILURE (HCC): ICD-10-CM

## 2022-07-25 DIAGNOSIS — I42.8 NONISCHEMIC CARDIOMYOPATHY (HCC): ICD-10-CM

## 2022-07-25 DIAGNOSIS — I42.8 NONISCHEMIC CARDIOMYOPATHY (HCC): Primary | ICD-10-CM

## 2022-07-25 LAB
ANION GAP SERPL CALCULATED.3IONS-SCNC: 11 MMOL/L (ref 7–19)
BUN BLDV-MCNC: 20 MG/DL (ref 6–20)
CALCIUM SERPL-MCNC: 9.6 MG/DL (ref 8.6–10)
CHLORIDE BLD-SCNC: 102 MMOL/L (ref 98–111)
CO2: 27 MMOL/L (ref 22–29)
CREAT SERPL-MCNC: 1 MG/DL (ref 0.5–1.2)
GFR AFRICAN AMERICAN: >59
GFR NON-AFRICAN AMERICAN: >60
GLUCOSE BLD-MCNC: 107 MG/DL (ref 74–109)
POTASSIUM SERPL-SCNC: 4.5 MMOL/L (ref 3.5–5)
SODIUM BLD-SCNC: 140 MMOL/L (ref 136–145)

## 2022-07-25 PROCEDURE — 99212 OFFICE O/P EST SF 10 MIN: CPT | Performed by: INTERNAL MEDICINE

## 2022-07-25 RX ORDER — TORSEMIDE 20 MG/1
20 TABLET ORAL DAILY
Qty: 90 TABLET | Refills: 3 | Status: SHIPPED | OUTPATIENT
Start: 2022-07-25 | End: 2022-07-25

## 2022-07-25 RX ORDER — TORSEMIDE 10 MG/1
10 TABLET ORAL DAILY
Qty: 30 TABLET | Refills: 3 | Status: SHIPPED | OUTPATIENT
Start: 2022-07-25 | End: 2022-09-22

## 2022-07-25 RX ORDER — SPIRONOLACTONE 25 MG/1
25 TABLET ORAL 2 TIMES DAILY
Qty: 90 TABLET | Refills: 3 | Status: SHIPPED | OUTPATIENT
Start: 2022-07-25

## 2022-07-25 RX ORDER — CARVEDILOL 3.12 MG/1
3.12 TABLET ORAL 2 TIMES DAILY WITH MEALS
Qty: 180 TABLET | Refills: 3 | Status: SHIPPED | OUTPATIENT
Start: 2022-07-25

## 2022-07-25 RX ORDER — POTASSIUM CHLORIDE 750 MG/1
10 TABLET, EXTENDED RELEASE ORAL DAILY
Qty: 90 TABLET | Refills: 3 | Status: SHIPPED | OUTPATIENT
Start: 2022-07-25

## 2022-07-25 NOTE — PROGRESS NOTES
Cardiology Progress Note   Kareen You MD      Patient:    Murvin Bosworth  [id number]  1975    Patient Active Problem List    Diagnosis Date Noted    Nonischemic cardiomyopathy ([facility] 06/30/2022     Priority: Medium    Acute combined systolic (congestive) and diastolic (congestive) heart failure ([facility] 06/29/2022     Priority: Medium       Admit Date:  (Not on file)    Admission Problem List: [unfilled]     Cardiac Specific Data:  Specialty Problems          Cardiology Problems    Acute combined systolic (congestive) and diastolic (congestive) heart failure (HCC)        Nonischemic cardiomyopathy ([facility]           Subjective:  Patient is a 26-year-old male admitted for nonischemic dilated cardiomyopathy June 29, 2022. Echocardiogram showed ejection fraction of under 35%. There was grade 3 diastolic dysfunction. Echocardiogram also showed estimated right ventricular systolic pressure of 57 mm which is elevated. He is a non-smoker nondrinker. He was treated for COVID-pneumonia at [facility]. Since discharge patient has been tolerating Entresto, MRA, beta-blocker and diuretic.   He denies any shortness of breath or chest pain    Objective:   BP 96/64   Pulse 88   Ht 5' 10\" (1.778 m)   Wt 248 lb (112.5 kg)   BMI 35.58 kg/m²     No intake or output data in the 24 hours ending 07/25/22 1024    Current Outpatient Medications   Medication Sig Dispense Refill    carvedilol (COREG) 3.125 MG tablet Take 1 tablet by mouth 2 times daily (with meals) 60 tablet 1    sacubitril-valsartan (ENTRESTO) 24-26 MG per tablet Take 1 tablet by mouth 2 times daily 60 tablet 1    spironolactone (ALDACTONE) 25 MG tablet Take 1 tablet by mouth 2 times daily 60 tablet 1    torsemide (DEMADEX) 20 MG tablet Take 1 tablet by mouth daily 30 tablet 1    potassium chloride (KLOR-CON M) 10 MEQ extended release tablet Take 1 tablet by mouth daily 30 tablet 0    omeprazole (PRILOSEC) 40 MG delayed release capsule Take 40 mg by mouth in the morning. buPROPion (WELLBUTRIN XL) 150 MG extended release tablet Take 150 mg by mouth daily       ergocalciferol (ERGOCALCIFEROL) 1.25 MG (16639 UT) capsule once a week       acyclovir (ZOVIRAX) 800 MG tablet       Armodafinil 250 MG TABS daily as needed. HYDROcodone-acetaminophen (NORCO) 7.5-325 MG per tablet Take 1 tablet by mouth every 6 hours as needed for Pain. No current facility-administered medications for this visit. Physical Exam:  General: NAD, alert  HEENT: normal  CHEST: clear to ascultation  CVS: S1 and S2 normal, no murmur, no JVD  ABD; nontender, bowel sounds normal, liver and spleen not palpable  MSK: Recent knee surgery  CNS: oriented X3, normal affect, normal CN  PVD:  all peripheral pulses normal, no swelling of the ankles      RECENT LABS:    Lab Data:  CBC: No results for input(s): WBC, HGB, HCT, MCV, PLT in the last 72 hours. BMP: No results for input(s): NA, K, CL, CO2, PHOS, BUN, CREATININE, CA in the last 72 hours. LIVER PROFILE: No results for input(s): AST, ALT, LIPASE, BILIDIR, BILITOT, ALKPHOS in the last 72 hours. Invalid input(s): AMYLASE,  ALB  PT/INR: No results for input(s): PROTIME, INR in the last 72 hours. APTT: No results for input(s): APTT in the last 72 hours. CK, CKMB, Troponin: No results for input(s): CKTOTAL, CKMB, TROPONINI in the last 72 hours. Last 3 BNP:  No results for input(s): PROBNP in the last 72 hours. IMAGING:  No results found. Assessment and Plan:    Recent acute on chronic systolic and diastolic heart failure, nonischemic dilated cardiomyopathy. Compensated at the moment  Echocardiogram showed dilated left ventricle with hypokinesis especially in the inferior wall and septum. There was grade 3 diastolic dysfunction. There were no significant valvular lesion seen. Estimated right ventricular systolic pressure was 57 mm which is moderately elevated.   Ejection fraction was 35%  CT of the chest showed groundglass appearance and mediastinal adenopathy  Recent knee surgery  Sinus tachycardia, now resolved    Plan: Patient still does not want to have the LifeVest.  We will cut the Demadex down to 10 mg daily since there is no swelling. I have asked him to lose weight. Echocardiogram will be repeated in 3 months time to follow-up of the ejection fraction. I have spent 30 minutes reviewing the chart, taking history, examining the patient, discussion with the patient and the family concerning the finding, diagnoses and treatment plan. This dictation was performed using [address]. Mistake and misspelling may have been created without  realizing them. Please notify me for clarification.   Thank you    Abdirashid Keith MD  7/25/2022 10:24 AM

## 2022-09-22 RX ORDER — TORSEMIDE 10 MG/1
TABLET ORAL
Qty: 90 TABLET | Refills: 1 | Status: SHIPPED | OUTPATIENT
Start: 2022-09-22

## 2022-10-25 ENCOUNTER — HOSPITAL ENCOUNTER (OUTPATIENT)
Dept: NON INVASIVE DIAGNOSTICS | Age: 47
Discharge: HOME | End: 2022-10-25
Payer: COMMERCIAL

## 2022-10-25 DIAGNOSIS — I42.8 NONISCHEMIC CARDIOMYOPATHY (HCC): ICD-10-CM

## 2022-10-25 LAB
LV EF: 35 %
LVEF MODALITY: NORMAL

## 2022-10-25 PROCEDURE — 6360000004 HC RX CONTRAST MEDICATION: Performed by: INTERNAL MEDICINE

## 2022-10-25 PROCEDURE — C8923 2D TTE W OR W/O FOL W/CON,CO: HCPCS

## 2022-10-25 RX ADMIN — PERFLUTREN 1.5 ML: 6.52 INJECTION, SUSPENSION INTRAVENOUS at 13:03

## 2022-11-15 ENCOUNTER — TELEPHONE (OUTPATIENT)
Dept: CARDIOLOGY CLINIC | Age: 47
End: 2022-11-15

## 2022-11-16 ENCOUNTER — OFFICE VISIT (OUTPATIENT)
Dept: CARDIOLOGY CLINIC | Age: 47
End: 2022-11-16
Payer: COMMERCIAL

## 2022-11-16 VITALS
WEIGHT: 260 LBS | HEIGHT: 70 IN | BODY MASS INDEX: 37.22 KG/M2 | HEART RATE: 87 BPM | SYSTOLIC BLOOD PRESSURE: 106 MMHG | DIASTOLIC BLOOD PRESSURE: 68 MMHG

## 2022-11-16 DIAGNOSIS — I42.8 NONISCHEMIC CARDIOMYOPATHY (HCC): Primary | ICD-10-CM

## 2022-11-16 DIAGNOSIS — I50.41 ACUTE COMBINED SYSTOLIC (CONGESTIVE) AND DIASTOLIC (CONGESTIVE) HEART FAILURE (HCC): ICD-10-CM

## 2022-11-16 PROCEDURE — 99214 OFFICE O/P EST MOD 30 MIN: CPT | Performed by: INTERNAL MEDICINE

## 2022-11-16 PROCEDURE — 93000 ELECTROCARDIOGRAM COMPLETE: CPT | Performed by: INTERNAL MEDICINE

## 2022-11-16 NOTE — PROGRESS NOTES
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 He was treated with Entresto, carvedilol, and diuretic therapy. Repeat echocardiogram was obtained 3 months later [10/25/2022] with no significant change in ventricular function -estimated ejection fraction 35%. RVSP not reassessed. He has been offered a LifeVest but has refused. On return today he has few complaints. Does relate some dyspnea on exertion when walking up a hill. All things considered he is relatively oligo symptomatic. Denies palpitations and still not interested in a LifeVest.  Received an injection in his knee about a week ago since which time he feels like he has gained some weight [up 12 pounds from last visit in [address] He has been vaccinated and boosted x1 for COVID-19. PHYSICAL EXAM  On exam he carries 260 pounds in a 5 foot 10 inch frame. Pressure is 106/68 pulse of 87. EOMs full, sclerae and conjunctiva normal. PERRLA. Mask in place. Trachea midline with no neck masses. Assessment of internal jugular veins reveals no elevation of central venous pressure at 45 degrees. Carotid pulses normal without delay or bruit. Thyroid normal to palpation. Chest exam reveals normal respiratory effort, no abnormal breath sounds and normal expiratory phase. No skin lesions seen. PMI normal. S1, S2 normal without murmur or brian or click. Abdominal obesity. Normal bowel sounds without palpable mass or bruit. No clubbing or acrocyanosis. 1+ pretibial edema. General motor strength appears to be within normal limits. Normal range of motion with normal gait. Alert, oriented x 3, memory and cognition normal as reflected by history and conversation.   EKG reveals normal sinus rhythm with an intraventricular conduction delay. ASSESSMENT/PLAN:   Nonischemic cardiomyopathy -compensated despite the fact that his weight has gone up 12 pounds since last visit. Discussed the need for weight loss to further reduce cardiac workload. Continue carvedilol, Entresto, and diuretic. Repeat echo in 6 months. Lipid status -acceptable with LDL 75, HDL 56, triglycerides 217  Blood pressure -acceptable on present therapy -tolerating the Entresto and carvedilol. Obesity -discussed. Minimize bread, potatoes, pasta, and rice  Pandemic response -vaccinated and boosted x1. Needs covalent  booster.

## 2023-01-21 DIAGNOSIS — I42.8 NONISCHEMIC CARDIOMYOPATHY (HCC): ICD-10-CM

## 2023-01-21 DIAGNOSIS — I50.41 ACUTE COMBINED SYSTOLIC (CONGESTIVE) AND DIASTOLIC (CONGESTIVE) HEART FAILURE (HCC): ICD-10-CM

## 2023-01-24 RX ORDER — SPIRONOLACTONE 25 MG/1
25 TABLET ORAL 2 TIMES DAILY
Qty: 180 TABLET | Refills: 1 | Status: SHIPPED | OUTPATIENT
Start: 2023-01-24

## 2023-02-17 RX ORDER — TORSEMIDE 10 MG/1
TABLET ORAL
Qty: 90 TABLET | Refills: 1 | Status: SHIPPED | OUTPATIENT
Start: 2023-02-17

## 2023-06-18 DIAGNOSIS — I50.41 ACUTE COMBINED SYSTOLIC (CONGESTIVE) AND DIASTOLIC (CONGESTIVE) HEART FAILURE (HCC): ICD-10-CM

## 2023-06-18 DIAGNOSIS — I42.8 NONISCHEMIC CARDIOMYOPATHY (HCC): ICD-10-CM

## 2023-06-20 RX ORDER — CARVEDILOL 3.12 MG/1
3.12 TABLET ORAL 2 TIMES DAILY WITH MEALS
Qty: 180 TABLET | Refills: 3 | Status: SHIPPED | OUTPATIENT
Start: 2023-06-20

## 2023-06-20 RX ORDER — SPIRONOLACTONE 25 MG/1
TABLET ORAL
Qty: 180 TABLET | Refills: 1 | Status: SHIPPED | OUTPATIENT
Start: 2023-06-20

## 2023-06-20 RX ORDER — POTASSIUM CHLORIDE 750 MG/1
TABLET, EXTENDED RELEASE ORAL
Qty: 90 TABLET | Refills: 3 | Status: SHIPPED | OUTPATIENT
Start: 2023-06-20

## 2023-08-08 ENCOUNTER — TELEPHONE (OUTPATIENT)
Dept: CARDIOLOGY CLINIC | Age: 48
End: 2023-08-08

## 2023-08-08 DIAGNOSIS — R06.02 SOB (SHORTNESS OF BREATH): ICD-10-CM

## 2023-08-08 DIAGNOSIS — I42.8 NONISCHEMIC CARDIOMYOPATHY (HCC): Primary | ICD-10-CM

## 2023-08-08 NOTE — TELEPHONE ENCOUNTER
Patient need a new order for ECHO patient missed his appointment to have his Echo in May.       Thank You

## 2023-09-05 ENCOUNTER — HOSPITAL ENCOUNTER (OUTPATIENT)
Dept: NON INVASIVE DIAGNOSTICS | Age: 48
Discharge: HOME | End: 2023-09-05
Payer: COMMERCIAL

## 2023-09-05 DIAGNOSIS — I42.8 NONISCHEMIC CARDIOMYOPATHY (HCC): ICD-10-CM

## 2023-09-05 DIAGNOSIS — R06.02 SOB (SHORTNESS OF BREATH): ICD-10-CM

## 2023-09-05 PROCEDURE — 6360000004 HC RX CONTRAST MEDICATION: Performed by: CLINICAL NURSE SPECIALIST

## 2023-09-05 PROCEDURE — C8929 TTE W OR WO FOL WCON,DOPPLER: HCPCS

## 2023-09-05 RX ADMIN — PERFLUTREN 1.5 ML: 6.52 INJECTION, SUSPENSION INTRAVENOUS at 15:04

## 2023-10-02 ENCOUNTER — OFFICE VISIT (OUTPATIENT)
Dept: CARDIOLOGY CLINIC | Age: 48
End: 2023-10-02
Payer: COMMERCIAL

## 2023-10-02 VITALS
BODY MASS INDEX: 37.08 KG/M2 | WEIGHT: 259 LBS | DIASTOLIC BLOOD PRESSURE: 82 MMHG | HEART RATE: 99 BPM | HEIGHT: 70 IN | OXYGEN SATURATION: 97 % | SYSTOLIC BLOOD PRESSURE: 108 MMHG

## 2023-10-02 DIAGNOSIS — I50.42 CHRONIC COMBINED SYSTOLIC AND DIASTOLIC HEART FAILURE (HCC): ICD-10-CM

## 2023-10-02 DIAGNOSIS — I42.8 NONISCHEMIC CARDIOMYOPATHY (HCC): Primary | ICD-10-CM

## 2023-10-02 PROCEDURE — 99214 OFFICE O/P EST MOD 30 MIN: CPT | Performed by: CLINICAL NURSE SPECIALIST

## 2023-10-02 RX ORDER — ACYCLOVIR 400 MG/1
400 TABLET ORAL 2 TIMES DAILY
COMMUNITY
Start: 2023-08-09

## 2023-10-02 ASSESSMENT — ENCOUNTER SYMPTOMS
CHEST TIGHTNESS: 0
VOMITING: 0
SHORTNESS OF BREATH: 1
FACIAL SWELLING: 0
ABDOMINAL PAIN: 0
WHEEZING: 0
NAUSEA: 0
EYE REDNESS: 0
COUGH: 0

## 2023-10-02 NOTE — PATIENT INSTRUCTIONS
Return in about 1 month (around 11/2/2023) for APRN. Consider biventricular defibrillator    OK to take an extra Torsemide for weight gain over 3lbs in 24 hours or 5lbs over a week. If weight is not improving by the next day, call the office.     - Weigh daily every morning after urinating (this is your dry weight). Report weight gain of 3lbs or more in 24hrs or 5lbs in one week. - Call for increasing shortness of breath or increasing swelling in feet and legs.     (This could mean you are retaining too much fluid)  - 2000mg low sodium diet  - Fluid restriction of 1500ml per day (about 6-8 cups (48-64oz) of fluid per day)

## 2023-10-03 RX ORDER — TORSEMIDE 10 MG/1
TABLET ORAL
Qty: 90 TABLET | Refills: 1 | Status: SHIPPED | OUTPATIENT
Start: 2023-10-03

## 2023-11-08 ENCOUNTER — TELEPHONE (OUTPATIENT)
Dept: CARDIOLOGY CLINIC | Age: 48
End: 2023-11-08

## 2023-11-08 ENCOUNTER — SCHEDULED TELEPHONE ENCOUNTER (OUTPATIENT)
Dept: CARDIOLOGY CLINIC | Age: 48
End: 2023-11-08
Payer: COMMERCIAL

## 2023-11-08 DIAGNOSIS — I50.42 CHRONIC COMBINED SYSTOLIC AND DIASTOLIC HEART FAILURE (HCC): Primary | ICD-10-CM

## 2023-11-08 DIAGNOSIS — I42.8 NONISCHEMIC CARDIOMYOPATHY (HCC): ICD-10-CM

## 2023-11-08 PROCEDURE — 99213 OFFICE O/P EST LOW 20 MIN: CPT | Performed by: CLINICAL NURSE SPECIALIST

## 2023-11-08 ASSESSMENT — ENCOUNTER SYMPTOMS
FACIAL SWELLING: 0
SHORTNESS OF BREATH: 0
EYE REDNESS: 0
WHEEZING: 0
CHEST TIGHTNESS: 0
ABDOMINAL PAIN: 0
COUGH: 1
VOMITING: 0
NAUSEA: 0

## 2023-11-08 NOTE — PROGRESS NOTES
Documentation:  I communicated with the patient and/or health care decision maker about CHF, ICD. Details of this discussion including any medical advice provided: Total Time: minutes: 21-30 minutes    Sharon Ricketts was evaluated through a synchronous (real-time) audio encounter. Patient identification was verified at the start of the visit. He (or guardian if applicable) is aware that this is a billable service, which includes applicable co-pays. This visit was conducted with the patient's (and/or legal guardian's) verbal consent. He has not had a related appointment within my department in the past 7 days or scheduled within the next 24 hours. The patient was located at Home: [address] [place] [address]. The provider was located at [facility] ([facility]): [id number] [place] [address]. [facility],  [id number] [place] [address]. Note: not billable if this call serves to triage the patient into an appointment for the relevant concern      Sharon Ricketts is a 51 y.o. male evaluated via telephone on 2023 for Follow-up and Cardiomyopathy  . ROSCOE Ospina      Diagnosis Orders   1. Chronic combined systolic and diastolic heart failure ([address])        2. 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 He was treated with Entresto, carvedilol, and diuretic therapy. Repeat echocardiogram was obtained 3 months later [10/25/2022] with no significant change in ventricular function -estimated ejection fraction 35%. RVSP not reassessed. He has been offered a LifeVest and debrillator,  but has refused.   Repeated

## 2023-11-08 NOTE — TELEPHONE ENCOUNTER
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 He is young and repeat his echo recently and EF remains depressed at 30 to 35%    Please schedule a biventricular fibrillator with Dr. Juan Arvizu. Patient requests a Thursday if this is possible.   Thank you

## 2023-11-09 DIAGNOSIS — Z01.818 PRE-OP TESTING: Primary | ICD-10-CM

## 2023-11-09 NOTE — TELEPHONE ENCOUNTER
Called and spoke with patient, have BiV ICD Insertion scheduled for 12/12/23 with a tentative time of 8 am with arrival of 6 am.  Advised we will contact patient if time/date changes. Patient is to be NPO after midnight. Patient instructed to arrive through front entrance of hospital and make immediate left. Patient advised may take morning medications with sip of water. Patient does not have IV dye allergy. Patient verbally understood. Lab orders placed.

## 2023-12-08 DIAGNOSIS — Z01.818 PRE-OP TESTING: ICD-10-CM

## 2023-12-08 LAB
ALBUMIN SERPL-MCNC: 4.5 G/DL (ref 3.5–5.2)
ALP SERPL-CCNC: 85 U/L (ref 40–130)
ALT SERPL-CCNC: 22 U/L (ref 5–41)
ANION GAP SERPL CALCULATED.3IONS-SCNC: 15 MMOL/L (ref 7–19)
AST SERPL-CCNC: 20 U/L (ref 5–40)
BILIRUB SERPL-MCNC: 0.5 MG/DL (ref 0.2–1.2)
BUN SERPL-MCNC: 22 MG/DL (ref 6–20)
CALCIUM SERPL-MCNC: 9.8 MG/DL (ref 8.6–10)
CHLORIDE SERPL-SCNC: 98 MMOL/L (ref 98–111)
CO2 SERPL-SCNC: 23 MMOL/L (ref 22–29)
CREAT SERPL-MCNC: 1.3 MG/DL (ref 0.5–1.2)
ERYTHROCYTE [DISTWIDTH] IN BLOOD BY AUTOMATED COUNT: 12.9 % (ref 11.5–14.5)
GLUCOSE SERPL-MCNC: 117 MG/DL (ref 74–109)
HCT VFR BLD AUTO: 41.3 % (ref 42–52)
HGB BLD-MCNC: 13.8 G/DL (ref 14–18)
MCH RBC QN AUTO: 31.7 PG (ref 27–31)
MCHC RBC AUTO-ENTMCNC: 33.4 G/DL (ref 33–37)
MCV RBC AUTO: 94.7 FL (ref 80–94)
PLATELET # BLD AUTO: 328 K/UL (ref 130–400)
PMV BLD AUTO: 9.9 FL (ref 9.4–12.4)
POTASSIUM SERPL-SCNC: 3.8 MMOL/L (ref 3.5–5)
PROT SERPL-MCNC: 8 G/DL (ref 6.6–8.7)
RBC # BLD AUTO: 4.36 M/UL (ref 4.7–6.1)
SODIUM SERPL-SCNC: 136 MMOL/L (ref 136–145)
WBC # BLD AUTO: 12.6 K/UL (ref 4.8–10.8)

## 2023-12-10 NOTE — H&P
Patient:  Wilfrido Hogue                  1975  MRN: [id number]    PROBLEM LIST:    Patient Active Problem List    Diagnosis Date Noted    Nonischemic cardiomyopathy ([address]) 06/30/2022     Priority: Medium    Acute combined systolic (congestive) and diastolic (congestive) heart failure ([address]) 06/29/2022     Priority: Medium       PRESENTATION: Wilfrido Hogue is a 51y.o. year old male who presents with diagnosis of nonischemic cardiomyopathy 6/2022 with ejection fraction of 35%, no evidence of significant coronary artery disease by catheterization, on medical management but previously refusing LifeVest as well as ICD placement, EKG with left bundle branch block, now presenting for follow-up with echo EF 9/5/2023 remaining severely depressed at 30 to 35%, NYHA class II symptomatology, agreeable for CRT-D placement as primary prophylaxis and management of heart failure. REVIEW OF SYSTEMS:  Review of Systems   Constitutional:  Positive for fatigue. Negative for activity change and diaphoresis. HENT:  Negative for hearing loss, nosebleeds and tinnitus. Eyes:  Negative for visual disturbance. Respiratory:  Negative for cough, shortness of breath and wheezing. Cardiovascular:  Negative for chest pain, palpitations and leg swelling. Gastrointestinal:  Negative for abdominal distention, abdominal pain, blood in stool, diarrhea and vomiting. Endocrine: Negative for cold intolerance, heat intolerance, polydipsia, polyphagia and polyuria. Genitourinary:  Negative for difficulty urinating, flank pain and hematuria. Musculoskeletal:  Negative for arthralgias, back pain, joint swelling and myalgias. Skin:  Negative for pallor and rash. Neurological:  Negative for dizziness, seizures, syncope and headaches. Psychiatric/Behavioral:  Negative for behavioral problems and dysphoric mood. The patient is not nervous/anxious.         Past Medical History:      Diagnosis Date    Depression     Gout     Shingles     on

## 2023-12-12 ENCOUNTER — HOSPITAL ENCOUNTER (OUTPATIENT)
Dept: CARDIAC CATH/INVASIVE PROCEDURES | Age: 48
Discharge: HOME | End: 2023-12-13
Attending: INTERNAL MEDICINE | Admitting: INTERNAL MEDICINE
Payer: COMMERCIAL

## 2023-12-12 ENCOUNTER — ANESTHESIA EVENT (OUTPATIENT)
Dept: CARDIAC CATH/INVASIVE PROCEDURES | Age: 48
End: 2023-12-12
Payer: COMMERCIAL

## 2023-12-12 ENCOUNTER — APPOINTMENT (OUTPATIENT)
Dept: GENERAL RADIOLOGY | Age: 48
End: 2023-12-12
Attending: INTERNAL MEDICINE
Payer: COMMERCIAL

## 2023-12-12 ENCOUNTER — ANESTHESIA (OUTPATIENT)
Dept: CARDIAC CATH/INVASIVE PROCEDURES | Age: 48
End: 2023-12-12
Payer: COMMERCIAL

## 2023-12-12 LAB
ANION GAP SERPL CALCULATED.3IONS-SCNC: 10 MMOL/L (ref 7–19)
BASOPHILS # BLD: 0.1 K/UL (ref 0–0.2)
BASOPHILS NFR BLD: 1.1 % (ref 0–1)
BUN SERPL-MCNC: 18 MG/DL (ref 6–20)
CALCIUM SERPL-MCNC: 9.6 MG/DL (ref 8.6–10)
CHLORIDE SERPL-SCNC: 104 MMOL/L (ref 98–111)
CO2 SERPL-SCNC: 28 MMOL/L (ref 22–29)
CREAT SERPL-MCNC: 1.1 MG/DL (ref 0.5–1.2)
EKG P AXIS: 59 DEGREES
EKG P-R INTERVAL: 194 MS
EKG Q-T INTERVAL: 408 MS
EKG QRS DURATION: 134 MS
EKG QTC CALCULATION (BAZETT): 453 MS
EKG T AXIS: 36 DEGREES
EOSINOPHIL # BLD: 0.5 K/UL (ref 0–0.6)
EOSINOPHIL NFR BLD: 4.9 % (ref 0–5)
ERYTHROCYTE [DISTWIDTH] IN BLOOD BY AUTOMATED COUNT: 12.8 % (ref 11.5–14.5)
GLUCOSE SERPL-MCNC: 91 MG/DL (ref 74–109)
HCT VFR BLD AUTO: 37.3 % (ref 42–52)
HGB BLD-MCNC: 12.4 G/DL (ref 14–18)
IMM GRANULOCYTES # BLD: 0.1 K/UL
LYMPHOCYTES # BLD: 2.8 K/UL (ref 1.1–4.5)
LYMPHOCYTES NFR BLD: 25 % (ref 20–40)
MCH RBC QN AUTO: 31.1 PG (ref 27–31)
MCHC RBC AUTO-ENTMCNC: 33.2 G/DL (ref 33–37)
MCV RBC AUTO: 93.5 FL (ref 80–94)
MONOCYTES # BLD: 1 K/UL (ref 0–0.9)
MONOCYTES NFR BLD: 9.4 % (ref 0–10)
NEUTROPHILS # BLD: 6.5 K/UL (ref 1.5–7.5)
NEUTS SEG NFR BLD: 58.9 % (ref 50–65)
PLATELET # BLD AUTO: 271 K/UL (ref 130–400)
PMV BLD AUTO: 9.9 FL (ref 9.4–12.4)
POTASSIUM SERPL-SCNC: 4.2 MMOL/L (ref 3.5–5)
RBC # BLD AUTO: 3.99 M/UL (ref 4.7–6.1)
SODIUM SERPL-SCNC: 142 MMOL/L (ref 136–145)
WBC # BLD AUTO: 11.1 K/UL (ref 4.8–10.8)

## 2023-12-12 PROCEDURE — 2500000003 HC RX 250 WO HCPCS

## 2023-12-12 PROCEDURE — 71045 X-RAY EXAM CHEST 1 VIEW: CPT

## 2023-12-12 PROCEDURE — 85025 COMPLETE CBC W/AUTO DIFF WBC: CPT

## 2023-12-12 PROCEDURE — 36415 COLL VENOUS BLD VENIPUNCTURE: CPT

## 2023-12-12 PROCEDURE — 6370000000 HC RX 637 (ALT 250 FOR IP): Performed by: INTERNAL MEDICINE

## 2023-12-12 PROCEDURE — C1895 LEAD, AICD, ENDO DUAL COIL: HCPCS

## 2023-12-12 PROCEDURE — 93005 ELECTROCARDIOGRAM TRACING: CPT

## 2023-12-12 PROCEDURE — 3700000000 HC ANESTHESIA ATTENDED CARE: Performed by: NURSE ANESTHETIST, CERTIFIED REGISTERED

## 2023-12-12 PROCEDURE — C1769 GUIDE WIRE: HCPCS

## 2023-12-12 PROCEDURE — C1894 INTRO/SHEATH, NON-LASER: HCPCS

## 2023-12-12 PROCEDURE — 2580000003 HC RX 258: Performed by: INTERNAL MEDICINE

## 2023-12-12 PROCEDURE — 33225 L VENTRIC PACING LEAD ADD-ON: CPT

## 2023-12-12 PROCEDURE — C1889 IMPLANT/INSERT DEVICE, NOC: HCPCS

## 2023-12-12 PROCEDURE — 6360000002 HC RX W HCPCS: Performed by: INTERNAL MEDICINE

## 2023-12-12 PROCEDURE — C1725 CATH, TRANSLUMIN NON-LASER: HCPCS

## 2023-12-12 PROCEDURE — C1900 LEAD, CORONARY VENOUS: HCPCS

## 2023-12-12 PROCEDURE — 6360000002 HC RX W HCPCS: Performed by: NURSE ANESTHETIST, CERTIFIED REGISTERED

## 2023-12-12 PROCEDURE — 2580000003 HC RX 258: Performed by: NURSE ANESTHETIST, CERTIFIED REGISTERED

## 2023-12-12 PROCEDURE — C1898 LEAD, PMKR, OTHER THAN TRANS: HCPCS

## 2023-12-12 PROCEDURE — 2500000003 HC RX 250 WO HCPCS: Performed by: NURSE ANESTHETIST, CERTIFIED REGISTERED

## 2023-12-12 PROCEDURE — 6360000004 HC RX CONTRAST MEDICATION: Performed by: INTERNAL MEDICINE

## 2023-12-12 PROCEDURE — C1882 AICD, OTHER THAN SING/DUAL: HCPCS

## 2023-12-12 PROCEDURE — 6360000002 HC RX W HCPCS

## 2023-12-12 PROCEDURE — 2709999900 HC NON-CHARGEABLE SUPPLY

## 2023-12-12 PROCEDURE — 3700000001 HC ADD 15 MINUTES (ANESTHESIA): Performed by: NURSE ANESTHETIST, CERTIFIED REGISTERED

## 2023-12-12 PROCEDURE — C1887 CATHETER, GUIDING: HCPCS

## 2023-12-12 PROCEDURE — 80048 BASIC METABOLIC PNL TOTAL CA: CPT

## 2023-12-12 PROCEDURE — 33249 INSJ/RPLCMT DEFIB W/LEAD(S): CPT

## 2023-12-12 RX ORDER — TORSEMIDE 10 MG/1
10 TABLET ORAL EVERY MORNING
Status: DISCONTINUED | OUTPATIENT
Start: 2023-12-13 | End: 2023-12-13 | Stop reason: HOSPADM

## 2023-12-12 RX ORDER — SPIRONOLACTONE 25 MG/1
25 TABLET ORAL 2 TIMES DAILY
Status: DISCONTINUED | OUTPATIENT
Start: 2023-12-12 | End: 2023-12-13 | Stop reason: HOSPADM

## 2023-12-12 RX ORDER — CHLORHEXIDINE GLUCONATE 40 MG/ML
SOLUTION TOPICAL ONCE
Status: COMPLETED | OUTPATIENT
Start: 2023-12-12 | End: 2023-12-12

## 2023-12-12 RX ORDER — MIDAZOLAM HYDROCHLORIDE 1 MG/ML
INJECTION INTRAMUSCULAR; INTRAVENOUS PRN
Status: DISCONTINUED | OUTPATIENT
Start: 2023-12-12 | End: 2023-12-12 | Stop reason: SDUPTHER

## 2023-12-12 RX ORDER — BUPROPION HYDROCHLORIDE 150 MG/1
150 TABLET ORAL DAILY
Status: DISCONTINUED | OUTPATIENT
Start: 2023-12-13 | End: 2023-12-13 | Stop reason: HOSPADM

## 2023-12-12 RX ORDER — ONDANSETRON 2 MG/ML
4 INJECTION INTRAMUSCULAR; INTRAVENOUS EVERY 6 HOURS PRN
Status: DISCONTINUED | OUTPATIENT
Start: 2023-12-12 | End: 2023-12-13 | Stop reason: HOSPADM

## 2023-12-12 RX ORDER — ACYCLOVIR 200 MG/1
400 CAPSULE ORAL 2 TIMES DAILY
Status: DISCONTINUED | OUTPATIENT
Start: 2023-12-12 | End: 2023-12-13 | Stop reason: HOSPADM

## 2023-12-12 RX ORDER — FENTANYL CITRATE 50 UG/ML
INJECTION, SOLUTION INTRAMUSCULAR; INTRAVENOUS PRN
Status: DISCONTINUED | OUTPATIENT
Start: 2023-12-12 | End: 2023-12-12 | Stop reason: SDUPTHER

## 2023-12-12 RX ORDER — CARVEDILOL 3.12 MG/1
3.12 TABLET ORAL 2 TIMES DAILY WITH MEALS
Status: DISCONTINUED | OUTPATIENT
Start: 2023-12-12 | End: 2023-12-13 | Stop reason: HOSPADM

## 2023-12-12 RX ORDER — OXYCODONE HYDROCHLORIDE AND ACETAMINOPHEN 5; 325 MG/1; MG/1
1 TABLET ORAL EVERY 4 HOURS PRN
Status: DISCONTINUED | OUTPATIENT
Start: 2023-12-12 | End: 2023-12-13 | Stop reason: HOSPADM

## 2023-12-12 RX ORDER — SODIUM CHLORIDE 0.9 % (FLUSH) 0.9 %
5-40 SYRINGE (ML) INJECTION PRN
Status: DISCONTINUED | OUTPATIENT
Start: 2023-12-12 | End: 2023-12-13 | Stop reason: HOSPADM

## 2023-12-12 RX ORDER — SODIUM CHLORIDE 9 MG/ML
INJECTION, SOLUTION INTRAVENOUS PRN
Status: DISCONTINUED | OUTPATIENT
Start: 2023-12-12 | End: 2023-12-13 | Stop reason: HOSPADM

## 2023-12-12 RX ORDER — PANTOPRAZOLE SODIUM 40 MG/1
40 TABLET, DELAYED RELEASE ORAL
Status: DISCONTINUED | OUTPATIENT
Start: 2023-12-13 | End: 2023-12-13 | Stop reason: HOSPADM

## 2023-12-12 RX ORDER — PROPOFOL 10 MG/ML
INJECTION, EMULSION INTRAVENOUS CONTINUOUS PRN
Status: DISCONTINUED | OUTPATIENT
Start: 2023-12-12 | End: 2023-12-12 | Stop reason: SDUPTHER

## 2023-12-12 RX ORDER — SODIUM CHLORIDE 0.9 % (FLUSH) 0.9 %
5-40 SYRINGE (ML) INJECTION EVERY 12 HOURS SCHEDULED
Status: DISCONTINUED | OUTPATIENT
Start: 2023-12-12 | End: 2023-12-13 | Stop reason: HOSPADM

## 2023-12-12 RX ORDER — LIDOCAINE HYDROCHLORIDE 10 MG/ML
INJECTION, SOLUTION EPIDURAL; INFILTRATION; INTRACAUDAL; PERINEURAL PRN
Status: DISCONTINUED | OUTPATIENT
Start: 2023-12-12 | End: 2023-12-12 | Stop reason: SDUPTHER

## 2023-12-12 RX ORDER — POTASSIUM CHLORIDE 20 MEQ/1
10 TABLET, EXTENDED RELEASE ORAL EVERY MORNING
Status: DISCONTINUED | OUTPATIENT
Start: 2023-12-13 | End: 2023-12-13 | Stop reason: HOSPADM

## 2023-12-12 RX ADMIN — SODIUM CHLORIDE: 9 INJECTION, SOLUTION INTRAVENOUS at 09:53

## 2023-12-12 RX ADMIN — SODIUM CHLORIDE, PRESERVATIVE FREE 10 ML: 5 INJECTION INTRAVENOUS at 20:54

## 2023-12-12 RX ADMIN — FENTANYL CITRATE 25 MCG: 50 INJECTION, SOLUTION INTRAMUSCULAR; INTRAVENOUS at 10:19

## 2023-12-12 RX ADMIN — PROPOFOL 100 MCG/KG/MIN: 10 INJECTION, EMULSION INTRAVENOUS at 10:06

## 2023-12-12 RX ADMIN — PHENYLEPHRINE HYDROCHLORIDE 200 MCG: 10 INJECTION INTRAVENOUS at 10:54

## 2023-12-12 RX ADMIN — CEFAZOLIN 2000 MG: 2 INJECTION, POWDER, FOR SOLUTION INTRAMUSCULAR; INTRAVENOUS at 10:14

## 2023-12-12 RX ADMIN — ACYCLOVIR 400 MG: 200 CAPSULE ORAL at 20:49

## 2023-12-12 RX ADMIN — PHENYLEPHRINE HYDROCHLORIDE 100 MCG: 10 INJECTION INTRAVENOUS at 10:19

## 2023-12-12 RX ADMIN — MIDAZOLAM 2 MG: 1 INJECTION INTRAMUSCULAR; INTRAVENOUS at 09:58

## 2023-12-12 RX ADMIN — WATER 2000 MG: 1 INJECTION INTRAMUSCULAR; INTRAVENOUS; SUBCUTANEOUS at 20:50

## 2023-12-12 RX ADMIN — CHLORHEXIDINE GLUCONATE 118 ML: 4 SOLUTION TOPICAL at 07:57

## 2023-12-12 RX ADMIN — FENTANYL CITRATE 25 MCG: 50 INJECTION, SOLUTION INTRAMUSCULAR; INTRAVENOUS at 10:40

## 2023-12-12 RX ADMIN — LIDOCAINE HYDROCHLORIDE 50 MG: 10 INJECTION, SOLUTION EPIDURAL; INFILTRATION; INTRACAUDAL; PERINEURAL at 10:06

## 2023-12-12 RX ADMIN — PHENYLEPHRINE HYDROCHLORIDE 50 MCG/MIN: 10 INJECTION INTRAVENOUS at 10:06

## 2023-12-12 RX ADMIN — OXYCODONE AND ACETAMINOPHEN 1 TABLET: 5; 325 TABLET ORAL at 23:29

## 2023-12-12 RX ADMIN — IOPAMIDOL 10 ML: 612 INJECTION, SOLUTION INTRAVENOUS at 11:45

## 2023-12-12 RX ADMIN — SODIUM CHLORIDE: 9 INJECTION, SOLUTION INTRAVENOUS at 07:57

## 2023-12-12 RX ADMIN — SACUBITRIL AND VALSARTAN 1 TABLET: 24; 26 TABLET, FILM COATED ORAL at 20:49

## 2023-12-12 RX ADMIN — PHENYLEPHRINE HYDROCHLORIDE 100 MCG: 10 INJECTION INTRAVENOUS at 10:40

## 2023-12-12 ASSESSMENT — PAIN DESCRIPTION - LOCATION: LOCATION: CHEST;SHOULDER

## 2023-12-12 ASSESSMENT — PAIN SCALES - GENERAL
PAINLEVEL_OUTOF10: 1
PAINLEVEL_OUTOF10: 5

## 2023-12-12 ASSESSMENT — PAIN DESCRIPTION - DESCRIPTORS: DESCRIPTORS: DULL;DISCOMFORT

## 2023-12-12 ASSESSMENT — PAIN DESCRIPTION - ORIENTATION: ORIENTATION: LEFT

## 2023-12-12 NOTE — PROGRESS NOTES
4 Eyes Skin Assessment     NAME:  Deisy Guerra OF BIRTH:  1975  MEDICAL RECORD NUMBER:  [id number]    The patient is being assessed for  Cath Lab Post-Op    I agree that at least one RN has performed a thorough Head to Toe Skin Assessment on the patient. ALL assessment sites listed below have been assessed. Areas assessed by both nurses:    Head, Face, Ears, Shoulders, Back, Chest, Arms, Elbows, Hands, Sacrum. Buttock, Coccyx, Ischium, Legs. Feet and Heels, and Under Medical Devices         Does the Patient have a Wound?  No noted wound(s)       Edgar Prevention initiated by RN: No  Wound Care Orders initiated by RN: No    Pressure Injury (Stage 3,4, Unstageable, DTI, NWPT, and Complex wounds) if present, place Wound referral order by RN under : No    New Ostomies, if present place, Ostomy referral order under : No     Nurse 1 eSignature: Electronically signed by Charli Cordero RN on 12/12/23 at 2:21 PM CST    **SHARE this note so that the co-signing nurse can place an eSignature**    Nurse 2 eSignature: Electronically signed by Reina Salas RN on 12/12/23 at 3:59 PM CST

## 2023-12-12 NOTE — PLAN OF CARE
Problem: Chronic Conditions and Co-morbidities  Goal: Patient's chronic conditions and co-morbidity symptoms are monitored and maintained or improved  Outcome: Progressing     Problem: Discharge Planning  Goal: Discharge to home or other facility with appropriate resources  Outcome: Progressing  Flowsheets (Taken 12/12/2023 1414)  Discharge to home or other facility with appropriate resources: Identify barriers to discharge with patient and caregiver     Problem: Safety - Adult  Goal: Free from fall injury  Outcome: Progressing     Problem: ABCDS Injury Assessment  Goal: Absence of physical injury  Outcome: Progressing

## 2023-12-12 NOTE — OP NOTE
Operative Note      Patient: Michael Menjivar  YOB: 1975  MRN: [id number]    Date of Procedure: 12/12/2023    Preop diagnosis: Nonischemic cardiomyopathy, ejection fraction 30-35%, left bundle branch block, NYHA class II    Post-Op Diagnosis: Same       Procedure: CRT-D placement    : COLEMAN Barth        Anesthesia: Anesthesia provided by anesthesia service  Anesthesia: Lidocaine LA  Sedation: Versed 0 mg; Fentanyl 0 mg  Start time: 10:36 AM  Stop time: 1203  ASA Class: 3  An independent trained observer pushed medications at my direction. Estimated blood loss less than 15 ML    The patient was monitored continuously with the ECG, pulse oximetry, blood pressure monitoring, and direct observation for level of consciousness. Complications: None      Detailed Description of Procedure:         After obtaining informed written consent, the patient was brought to the catheterization laboratory where the left chest area was prepared in the usual sterile fashion. The patient was monitored continuously with ECG, pulse oximetry, blood pressure monitoring and direct observation. The 's hands were scrubbed in a betadine solution for 5 minutes. Moderate conscious sedation was administered at my direction. Utilizing local anesthesia and a percutaneous technique, a single puncture was made in the left subclavian vein. Utilizing a combination of sharp and blunt dissection, a CRT-D pocket was created. A second and third puncture was then made in the left subclavian vein. The right ventricular ICD and right atrial leads were inserted without difficulty and appropriate thresholds were obtained. Utilizing an attain MPX catheter with an AL 3 5 Maldivian diagnostic catheter within the MPX, the coronary sinus was engaged and wired using a Marissa wire. A suitable vein was identified.   An S shaped CS lead was then inserted into this vein and secured with good lead impedances, thresholds and

## 2023-12-12 NOTE — ANESTHESIA PRE PROCEDURE
Department of Anesthesiology  Preprocedure Note       Name:  Enmanuel Seth   Age:  51 y.o.  :  1975                                          MRN:  [id number]         Date:  2023      Surgeon: * Surgery not found *    Procedure:     Medications prior to admission:   Prior to Admission medications    Medication Sig Start Date End Date Taking? Authorizing Provider   torsemide (DEMADEX) 10 MG tablet TAKE 1 TABLET BY MOUTH EVERY DAY IN THE MORNING 10/3/23   ROSCOE Barnes NP   acyclovir (ZOVIRAX) 400 MG tablet Take 1 tablet by mouth 2 times daily 23   Deon Sellers MD   potassium chloride (KLOR-CON M) 10 MEQ extended release tablet TAKE 1 TABLET BY MOUTH EVERY DAY IN THE MORNING 23   ROSCOE Rodriguez   carvedilol (COREG) 3.125 MG tablet TAKE 1 TABLET BY MOUTH IN THE MORNING AND 1 TABLET IN THE EVENING. TAKE WITH MEALS. 23   ROSCOE Rodriguez   spironolactone (ALDACTONE) 25 MG tablet TAKE 1 TABLET BY MOUTH IN THE MORNING AND IN THE EVENING 23   ROSCOE Barnes NP   sacubitril-valsartan (ENTRESTO) 24-26 MG per tablet Take 1 tablet by mouth in the morning and 1 tablet before bedtime. 22   Jackson Benedict MD   omeprazole (PRILOSEC) 40 MG delayed release capsule Take 1 capsule by mouth daily    Deon Sellers MD   buPROPion (WELLBUTRIN XL) 150 MG extended release tablet Take 1 tablet by mouth daily 21   Deon Sellers MD   ergocalciferol (ERGOCALCIFEROL) 1.25 MG (87517 UT) capsule once a week  8/3/21   Deon Sellers MD   Armodafinil 250 MG TABS daily as needed.      Deon Sellers MD       Current medications:    Current Facility-Administered Medications   Medication Dose Route Frequency Provider Last Rate Last Admin    sodium chloride flush 0.9 % injection 5-40 mL  5-40 mL IntraVENous 2 times per day Juliette St MD        sodium chloride flush 0.9 % injection 5-40 mL  5-40 mL IntraVENous PRN Juliette St MD        0.9 % sodium chloride

## 2023-12-13 VITALS
HEART RATE: 90 BPM | SYSTOLIC BLOOD PRESSURE: 106 MMHG | WEIGHT: 236.5 LBS | HEIGHT: 70 IN | BODY MASS INDEX: 33.86 KG/M2 | OXYGEN SATURATION: 98 % | RESPIRATION RATE: 16 BRPM | DIASTOLIC BLOOD PRESSURE: 59 MMHG | TEMPERATURE: 97.9 F

## 2023-12-13 PROCEDURE — 6370000000 HC RX 637 (ALT 250 FOR IP): Performed by: INTERNAL MEDICINE

## 2023-12-13 PROCEDURE — 2580000003 HC RX 258: Performed by: INTERNAL MEDICINE

## 2023-12-13 PROCEDURE — 6360000002 HC RX W HCPCS: Performed by: INTERNAL MEDICINE

## 2023-12-13 RX ADMIN — BUPROPION HYDROCHLORIDE 150 MG: 150 TABLET, EXTENDED RELEASE ORAL at 08:38

## 2023-12-13 RX ADMIN — PANTOPRAZOLE SODIUM 40 MG: 40 TABLET, DELAYED RELEASE ORAL at 08:38

## 2023-12-13 RX ADMIN — TORSEMIDE 10 MG: 10 TABLET ORAL at 08:38

## 2023-12-13 RX ADMIN — SODIUM CHLORIDE, PRESERVATIVE FREE 10 ML: 5 INJECTION INTRAVENOUS at 08:39

## 2023-12-13 RX ADMIN — ACYCLOVIR 400 MG: 200 CAPSULE ORAL at 08:38

## 2023-12-13 RX ADMIN — SPIRONOLACTONE 25 MG: 25 TABLET ORAL at 08:38

## 2023-12-13 RX ADMIN — POTASSIUM CHLORIDE 10 MEQ: 1500 TABLET, EXTENDED RELEASE ORAL at 08:39

## 2023-12-13 RX ADMIN — WATER 2000 MG: 1 INJECTION INTRAMUSCULAR; INTRAVENOUS; SUBCUTANEOUS at 04:50

## 2023-12-13 RX ADMIN — CARVEDILOL 3.12 MG: 3.12 TABLET, FILM COATED ORAL at 08:38

## 2023-12-13 RX ADMIN — OXYCODONE AND ACETAMINOPHEN 1 TABLET: 5; 325 TABLET ORAL at 08:38

## 2023-12-13 RX ADMIN — SACUBITRIL AND VALSARTAN 1 TABLET: 24; 26 TABLET, FILM COATED ORAL at 08:39

## 2023-12-13 ASSESSMENT — PAIN DESCRIPTION - DESCRIPTORS: DESCRIPTORS: ACHING

## 2023-12-13 ASSESSMENT — PAIN SCALES - GENERAL: PAINLEVEL_OUTOF10: 4

## 2023-12-13 ASSESSMENT — PAIN DESCRIPTION - ORIENTATION: ORIENTATION: LEFT

## 2023-12-13 ASSESSMENT — PAIN DESCRIPTION - LOCATION: LOCATION: CHEST

## 2023-12-13 NOTE — DISCHARGE SUMMARY
Discharge Summary    Mattie Slater  :  1975  MRN:  [id number]    Admit date:  2023  Discharge date:      Admitting Physician:  Jeanie Kumari MD    Advance Directive: Full Code    Consults: none    Primary Care Physician:  Leobardo Mcfarland, APRN - CNP    Discharge Diagnoses:  Principal Problem:    Nonischemic cardiomyopathy [facility])  Resolved Problems:    * No resolved hospital problems. *      Problem List:   Patient Active Problem List    Diagnosis Date Noted    Nonischemic cardiomyopathy ([address]) 2022     Priority: Medium    Acute combined systolic (congestive) and diastolic (congestive) heart failure ([address]) 2022     Priority: Medium       Cardiology Specific Data:  Specialty Problems          Cardiology Problems    Acute combined systolic (congestive) and diastolic (congestive) heart failure (HCC)        * (Principal) Nonischemic cardiomyopathy ([address])           Significant Diagnostic Studies:   XR CHEST PORTABLE    Result Date: 2023  EXAM: CHEST RADIOGRAPH  TECHNIQUE: Single frontal chest radiograph. COMPARISON: 2022  HISTORY: Pacemaker placement. FINDINGS: A new left chest cardiac device is noted. The heart and mediastinum are stable. Prominent bibasilar markings are again noted. No pneumothorax. No pleural effusion. No acute abnormality of the bones or soft tissues is identified. The a new left chest cardiac device is identified, without pneumothorax.    ______________________________________ Electronically signed by: Ivelisse Abreu M.D. Date:     2023 Time:    13:42       Pertinent Labs:   CBC:   Recent Labs     23  0744   WBC 11.1*   HGB 12.4*        BMP:    Recent Labs     23  0744      K 4.2      CO2 28   BUN 18   CREATININE 1.1   GLUCOSE 91     INR: No results for input(s): \"INR\" in the last 72 hours. Lipids: No results for input(s): \"CHOL\", \"HDL\" in the last 72 hours.     Invalid input(s): \"LDLCALCU\"  ABGs:No results for input(s):

## 2024-01-08 DIAGNOSIS — I42.8 NONISCHEMIC CARDIOMYOPATHY (HCC): ICD-10-CM

## 2024-01-08 DIAGNOSIS — I50.41 ACUTE COMBINED SYSTOLIC (CONGESTIVE) AND DIASTOLIC (CONGESTIVE) HEART FAILURE (HCC): ICD-10-CM

## 2024-01-09 RX ORDER — SPIRONOLACTONE 25 MG/1
TABLET ORAL
Qty: 180 TABLET | Refills: 1 | Status: SHIPPED | OUTPATIENT
Start: 2024-01-09

## 2024-01-22 ENCOUNTER — TELEPHONE (OUTPATIENT)
Dept: CARDIOLOGY CLINIC | Age: 49
End: 2024-01-22

## 2024-02-09 ENCOUNTER — TELEPHONE (OUTPATIENT)
Dept: CARDIOLOGY CLINIC | Age: 49
End: 2024-02-09

## 2024-02-12 ENCOUNTER — OFFICE VISIT (OUTPATIENT)
Dept: CARDIOLOGY CLINIC | Age: 49
End: 2024-02-12
Payer: COMMERCIAL

## 2024-02-12 VITALS
SYSTOLIC BLOOD PRESSURE: 116 MMHG | HEIGHT: 70 IN | BODY MASS INDEX: 34.36 KG/M2 | DIASTOLIC BLOOD PRESSURE: 70 MMHG | HEART RATE: 87 BPM | WEIGHT: 240 LBS

## 2024-02-12 DIAGNOSIS — I50.42 CHRONIC COMBINED SYSTOLIC AND DIASTOLIC HEART FAILURE (HCC): ICD-10-CM

## 2024-02-12 DIAGNOSIS — Z95.810 BIVENTRICULAR ICD (IMPLANTABLE CARDIOVERTER-DEFIBRILLATOR) IN PLACE: ICD-10-CM

## 2024-02-12 DIAGNOSIS — I42.8 NONISCHEMIC CARDIOMYOPATHY (HCC): Primary | ICD-10-CM

## 2024-02-12 DIAGNOSIS — I10 PRIMARY HYPERTENSION: ICD-10-CM

## 2024-02-12 PROCEDURE — 93284 PRGRMG EVAL IMPLANTABLE DFB: CPT | Performed by: CLINICAL NURSE SPECIALIST

## 2024-02-12 PROCEDURE — 3078F DIAST BP <80 MM HG: CPT | Performed by: CLINICAL NURSE SPECIALIST

## 2024-02-12 PROCEDURE — 99214 OFFICE O/P EST MOD 30 MIN: CPT | Performed by: CLINICAL NURSE SPECIALIST

## 2024-02-12 PROCEDURE — 3074F SYST BP LT 130 MM HG: CPT | Performed by: CLINICAL NURSE SPECIALIST

## 2024-02-12 NOTE — PROGRESS NOTES
[facility], [facility]  [address] [address]  Phone: [phone]  Fax: [phone]    OFFICE VISIT:  2024    Javier Vela - : 1975    Reason For Visit:  Javier is a 48 y.o. male who is here for Follow-up and Cardiomyopathy       Diagnosis Orders   1. Nonischemic cardiomyopathy (HCC)        2. Chronic combined systolic and diastolic heart failure (HCC)        3. Primary hypertension        4. 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  He was treated with Entresto, carvedilol, and diuretic therapy.  Repeat echocardiogram was obtained 3 months later [10/25/2022] with no significant change in ventricular function -estimated ejection fraction 35%.  RVSP not reassessed.  He has been offered a LifeVest and debrillator,  but has refused    He has had no significant change in symptoms.  He has some mild, chronic dyspnea.  He denies chest pain.  He denies orthopnea, PND, edema, or sudden weight gain.  He wants to know if he can get off some of his medications or possibly grade for  Dale Gonsalez APRN - CNP is PCP.  Javier Vela has the following history as recorded in [facility]:    Patient Active Problem List    Diagnosis Date Noted    Nonischemic cardiomyopathy (HCC) 2022    Acute combined systolic (congestive) and diastolic (congestive) heart failure (HCC) 2022    Chronic combined systolic and diastolic heart failure (HCC) 2024    Primary hypertension 2024    Biventricular ICD (implantable cardioverter-defibrillator) in place 2024     Past Medical History:   Diagnosis Date

## 2024-02-12 NOTE — PATIENT INSTRUCTIONS
Return in about 3 months (around 5/12/2024) for APRN.  Consider Jardiance or Farxiga    OK to take an extra Torsemide for weight gain over 3lbs in 24 hours or 5lbs over a week.   If weight is not improving by the next day, call the office.     - Weigh daily every morning after urinating (this is your dry weight).   Report weight gain of 3lbs or more in 24hrs or 5lbs in one week.  - Call for increasing shortness of breath or increasing swelling in feet and legs.    (This could mean you are retaining too much fluid)  - 2000mg low sodium diet  - Fluid restriction of 1500ml per day (about 6-8 cups (48-64oz) of fluid per day)

## 2024-05-14 PROCEDURE — 93295 DEV INTERROG REMOTE 1/2/MLT: CPT | Performed by: CLINICAL NURSE SPECIALIST

## 2024-05-14 PROCEDURE — 93296 REM INTERROG EVL PM/IDS: CPT | Performed by: CLINICAL NURSE SPECIALIST

## 2024-05-15 DIAGNOSIS — I42.8 NONISCHEMIC CARDIOMYOPATHY (HCC): ICD-10-CM

## 2024-05-15 DIAGNOSIS — Z95.810 BIVENTRICULAR ICD (IMPLANTABLE CARDIOVERTER-DEFIBRILLATOR) IN PLACE: Primary | ICD-10-CM

## 2024-05-15 DIAGNOSIS — I47.10 PSVT (PAROXYSMAL SUPRAVENTRICULAR TACHYCARDIA) (HCC): ICD-10-CM

## 2024-05-15 DIAGNOSIS — I50.42 CHRONIC COMBINED SYSTOLIC AND DIASTOLIC HEART FAILURE (HCC): ICD-10-CM

## 2024-07-04 DIAGNOSIS — I42.8 NONISCHEMIC CARDIOMYOPATHY (HCC): ICD-10-CM

## 2024-07-04 DIAGNOSIS — I50.41 ACUTE COMBINED SYSTOLIC (CONGESTIVE) AND DIASTOLIC (CONGESTIVE) HEART FAILURE (HCC): ICD-10-CM

## 2024-07-08 ENCOUNTER — TELEPHONE (OUTPATIENT)
Dept: CARDIOLOGY CLINIC | Age: 49
End: 2024-07-08

## 2024-07-08 DIAGNOSIS — Z45.02 ICD (IMPLANTABLE CARDIOVERTER-DEFIBRILLATOR) DISCHARGE: ICD-10-CM

## 2024-07-08 DIAGNOSIS — I47.20 VENTRICULAR TACHYCARDIA (HCC): ICD-10-CM

## 2024-07-08 DIAGNOSIS — Z45.02 ICD (IMPLANTABLE CARDIOVERTER-DEFIBRILLATOR) DISCHARGE: Primary | ICD-10-CM

## 2024-07-08 DIAGNOSIS — I50.42 CHRONIC COMBINED SYSTOLIC AND DIASTOLIC HEART FAILURE (HCC): ICD-10-CM

## 2024-07-08 DIAGNOSIS — Z95.810 BIVENTRICULAR ICD (IMPLANTABLE CARDIOVERTER-DEFIBRILLATOR) IN PLACE: Primary | ICD-10-CM

## 2024-07-08 RX ORDER — POTASSIUM CHLORIDE 750 MG/1
10 TABLET, EXTENDED RELEASE ORAL EVERY MORNING
Qty: 90 TABLET | Refills: 3 | Status: SHIPPED | OUTPATIENT
Start: 2024-07-08

## 2024-07-08 RX ORDER — SPIRONOLACTONE 25 MG/1
TABLET ORAL
Qty: 180 TABLET | Refills: 3 | Status: SHIPPED | OUTPATIENT
Start: 2024-07-08

## 2024-07-08 RX ORDER — CARVEDILOL 3.12 MG/1
TABLET ORAL
Qty: 180 TABLET | Refills: 3 | Status: SHIPPED | OUTPATIENT
Start: 2024-07-08

## 2024-07-08 NOTE — TELEPHONE ENCOUNTER
Javier requests that a nurse return their call. The best time to reach him is Anytime. Patient called and said his defibrillator shocked him several times this weekend and didn't know if javier wroley would want to see him sooner.     Thank you.

## 2024-07-09 NOTE — RESULT ENCOUNTER NOTE
Left message for patient with recommendation to have lab work drawn.  He can go to the office in [facility] on [address] if that is more convenient for him.  The ordered were placed.

## 2024-07-18 ENCOUNTER — OFFICE VISIT (OUTPATIENT)
Dept: CARDIOLOGY CLINIC | Age: 49
End: 2024-07-18
Payer: COMMERCIAL

## 2024-07-18 VITALS
DIASTOLIC BLOOD PRESSURE: 82 MMHG | SYSTOLIC BLOOD PRESSURE: 120 MMHG | HEART RATE: 107 BPM | BODY MASS INDEX: 33.21 KG/M2 | HEIGHT: 70 IN | WEIGHT: 232 LBS

## 2024-07-18 DIAGNOSIS — I10 PRIMARY HYPERTENSION: ICD-10-CM

## 2024-07-18 DIAGNOSIS — Z45.02 ICD (IMPLANTABLE CARDIOVERTER-DEFIBRILLATOR) DISCHARGE: Primary | ICD-10-CM

## 2024-07-18 DIAGNOSIS — I42.8 NONISCHEMIC CARDIOMYOPATHY (HCC): ICD-10-CM

## 2024-07-18 DIAGNOSIS — Z95.810 BIVENTRICULAR ICD (IMPLANTABLE CARDIOVERTER-DEFIBRILLATOR) IN PLACE: ICD-10-CM

## 2024-07-18 DIAGNOSIS — I50.42 CHRONIC COMBINED SYSTOLIC AND DIASTOLIC HEART FAILURE (HCC): ICD-10-CM

## 2024-07-18 PROCEDURE — 93284 PRGRMG EVAL IMPLANTABLE DFB: CPT | Performed by: CLINICAL NURSE SPECIALIST

## 2024-07-18 PROCEDURE — 99214 OFFICE O/P EST MOD 30 MIN: CPT | Performed by: CLINICAL NURSE SPECIALIST

## 2024-07-18 PROCEDURE — 3079F DIAST BP 80-89 MM HG: CPT | Performed by: CLINICAL NURSE SPECIALIST

## 2024-07-18 PROCEDURE — 3074F SYST BP LT 130 MM HG: CPT | Performed by: CLINICAL NURSE SPECIALIST

## 2024-07-18 RX ORDER — CARVEDILOL 6.25 MG/1
6.25 TABLET ORAL 2 TIMES DAILY
Qty: 180 TABLET | Refills: 3 | Status: SHIPPED | OUTPATIENT
Start: 2024-07-18

## 2024-07-18 RX ORDER — ALLOPURINOL 300 MG/1
300 TABLET ORAL DAILY
COMMUNITY
Start: 2024-04-23

## 2024-07-18 ASSESSMENT — ENCOUNTER SYMPTOMS
COUGH: 0
CHEST TIGHTNESS: 0
EYE REDNESS: 0
WHEEZING: 0
ABDOMINAL PAIN: 0
VOMITING: 0
SHORTNESS OF BREATH: 1
FACIAL SWELLING: 0
NAUSEA: 0

## 2024-07-18 NOTE — PATIENT INSTRUCTIONS
Return in about 3 months (around 10/18/2024) for APRN.  Cut back caffeine and stiumulants    Increase carvedilol to 6.25mg twice a day    OK to take an extra Torsemide for weight gain over 3lbs in 24 hours or 5lbs over a week.   If weight is not improving by the next day, call the office.     - Weigh daily every morning after urinating (this is your dry weight).   Report weight gain of 3lbs or more in 24hrs or 5lbs in one week.  - Call for increasing shortness of breath or increasing swelling in feet and legs.    (This could mean you are retaining too much fluid)  - 2000mg low sodium diet  - Fluid restriction of 1500ml per day (about 6-8 cups (48-64oz) of fluid per day)

## 2024-07-18 NOTE — PROGRESS NOTES
[facility] [place]  [address] [address]  Phone: [phone]  Fax: [phone]    OFFICE VISIT:  2024    Javier Vela - : 1975    Reason For Visit:  Javier is a 49 y.o. male who is here for Follow-up (No cardiac symptoms) and Nonischemic cardiomyopathy       Diagnosis Orders   1. ICD (implantable cardioverter-defibrillator) discharge        2. Nonischemic cardiomyopathy (HCC)        3. Chronic combined systolic and diastolic heart failure (HCC)        4. Primary hypertension        5. 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  He was treated with Entresto, carvedilol, and diuretic therapy.  Repeat echocardiogram was obtained 3 months later [10/25/2022] with no significant change in ventricular function -estimated ejection fraction 35%.  RVSP not reassessed.  He was offered a LifeVest and debrillator,  but had refused    He returned to the office later in  and did go forward with a defibrillator on 2023.      He returns the office today for early follow-up.  Patient had 2 ICD discharges while he was working outdoors on a hot day.  Patient states he did not pass out and did not feel poorly following the ICD discharges.  It did scare him though.  He was ordered to do labs which she did approximately 1 week later.  These are under the media tab with no significant abnormalities.  Patient states he has been taking his medications regularly.    Patient has stopped taking Armodanfinil which is a stimulant he uses on an as-needed basis.  He drinks a lot of Mountain Dew's throughout the day    He has

## 2024-07-25 DIAGNOSIS — I50.41 ACUTE COMBINED SYSTOLIC (CONGESTIVE) AND DIASTOLIC (CONGESTIVE) HEART FAILURE (HCC): ICD-10-CM

## 2024-07-25 DIAGNOSIS — I42.8 NONISCHEMIC CARDIOMYOPATHY (HCC): ICD-10-CM

## 2024-10-21 ENCOUNTER — OFFICE VISIT (OUTPATIENT)
Dept: CARDIOLOGY CLINIC | Age: 49
End: 2024-10-21

## 2024-10-21 VITALS
OXYGEN SATURATION: 98 % | DIASTOLIC BLOOD PRESSURE: 82 MMHG | HEART RATE: 79 BPM | SYSTOLIC BLOOD PRESSURE: 114 MMHG | HEIGHT: 70 IN | BODY MASS INDEX: 32.93 KG/M2 | WEIGHT: 230 LBS

## 2024-10-21 DIAGNOSIS — I47.29 NSVT (NONSUSTAINED VENTRICULAR TACHYCARDIA) (HCC): ICD-10-CM

## 2024-10-21 DIAGNOSIS — I50.42 CHRONIC COMBINED SYSTOLIC AND DIASTOLIC HEART FAILURE (HCC): ICD-10-CM

## 2024-10-21 DIAGNOSIS — I10 PRIMARY HYPERTENSION: ICD-10-CM

## 2024-10-21 DIAGNOSIS — I42.8 NONISCHEMIC CARDIOMYOPATHY (HCC): Primary | ICD-10-CM

## 2024-10-21 DIAGNOSIS — Z95.810 BIVENTRICULAR ICD (IMPLANTABLE CARDIOVERTER-DEFIBRILLATOR) IN PLACE: ICD-10-CM

## 2024-10-21 RX ORDER — CARVEDILOL 3.12 MG/1
3.12 TABLET ORAL SEE ADMIN INSTRUCTIONS
Qty: 270 TABLET | Refills: 3 | Status: SHIPPED | OUTPATIENT
Start: 2024-10-21

## 2024-10-21 ASSESSMENT — ENCOUNTER SYMPTOMS
NAUSEA: 0
ABDOMINAL PAIN: 0
FACIAL SWELLING: 0
SHORTNESS OF BREATH: 1
VOMITING: 0
CHEST TIGHTNESS: 0
COUGH: 0
WHEEZING: 0
EYE REDNESS: 0

## 2024-10-21 NOTE — PROGRESS NOTES
7/24 while working outdoors.  No significant recurrence.  Continue to monitor    Stable cardiovascular status. No evidence of overt heart failure,angina or dysrhythmia.     Plan    Return in about 6 months (around 4/21/2025) for ROSCOE.  Carvedilol 6.25mg a.m. and 3.125mg pm    OK to take an extra Torsemide for weight gain over 3lbs in 24 hours or 5lbs over a week.   If weight is not improving by the next day, call the office.     - Weigh daily every morning after urinating (this is your dry weight).   Report weight gain of 3lbs or more in 24hrs or 5lbs in one week.  - Call for increasing shortness of breath or increasing swelling in feet and legs.    (This could mean you are retaining too much fluid)  - 2000mg low sodium diet  - Fluid restriction of 1500ml per day (about 6-8 cups (48-64oz) of fluid per day)     Call with any questionsor concerns  Follow up with Dale Suarez APRN - CNP for non cardiac problems  Report any new problems  Cardiovascular Fitness-Exercise as tolerated.  Strive for 15 minutes of exercise most days of the week.    Cardiac / HealthyDiet  Continue current medications as directed  Continue plan of treatment  It is always recommended that you bring your medicationsbottles with you to each visit - this is for your safety!       ROSCOE Corona

## 2024-10-21 NOTE — PATIENT INSTRUCTIONS
Return in about 6 months (around 4/21/2025) for APRN.  Carvedilol 6.25mg a.m. and 3.125mg pm    OK to take an extra Torsemide for weight gain over 3lbs in 24 hours or 5lbs over a week.   If weight is not improving by the next day, call the office.     - Weigh daily every morning after urinating (this is your dry weight).   Report weight gain of 3lbs or more in 24hrs or 5lbs in one week.  - Call for increasing shortness of breath or increasing swelling in feet and legs.    (This could mean you are retaining too much fluid)  - 2000mg low sodium diet  - Fluid restriction of 1500ml per day (about 6-8 cups (48-64oz) of fluid per day)

## 2024-12-03 RX ORDER — TORSEMIDE 10 MG/1
10 TABLET ORAL EVERY MORNING
Qty: 90 TABLET | Refills: 1 | Status: CANCELLED | OUTPATIENT
Start: 2024-12-03

## 2024-12-05 RX ORDER — TORSEMIDE 10 MG/1
20 TABLET ORAL DAILY
Qty: 90 TABLET | Refills: 1 | Status: SHIPPED | OUTPATIENT
Start: 2024-12-05

## 2025-01-23 DIAGNOSIS — I47.20 VENTRICULAR TACHYARRHYTHMIA (HCC): ICD-10-CM

## 2025-01-23 DIAGNOSIS — I50.42 CHRONIC COMBINED SYSTOLIC AND DIASTOLIC HEART FAILURE (HCC): ICD-10-CM

## 2025-01-23 DIAGNOSIS — Z95.810 BIVENTRICULAR ICD (IMPLANTABLE CARDIOVERTER-DEFIBRILLATOR) IN PLACE: Primary | ICD-10-CM

## 2025-01-23 DIAGNOSIS — Z45.02 ICD (IMPLANTABLE CARDIOVERTER-DEFIBRILLATOR) DISCHARGE: ICD-10-CM

## 2025-01-23 DIAGNOSIS — I42.8 NONISCHEMIC CARDIOMYOPATHY (HCC): ICD-10-CM

## 2025-01-23 NOTE — PROGRESS NOTES
Left message for patient to return call.  [facility] ICD report received.  Will need to find out if he is having any symptoms of CHF.  Recommended he schedule follow up office visit

## 2025-02-04 ENCOUNTER — TELEPHONE (OUTPATIENT)
Dept: CARDIOLOGY CLINIC | Age: 50
End: 2025-02-04

## 2025-02-04 NOTE — TELEPHONE ENCOUNTER
Date: 3/10/25    Cardiologist: Fabian    Procedure: Left Total Knee    Surgeon: Adonis Sampson    Last Office Visit: 10/21/24  Reason for office visit and medical concerns addressed at this office visit: cardiomyopathy, htn, h/o ICD    Testing Performed and Date of Service:  9/5/23 Echo  Mild mitral regurgitation is present.   Mildly dilated left ventricle.   Generalized hypokinesis of the left ventricle with impairment of LV   systolic function.   Left ventricular ejection fraction is visually estimated at 30-35%.   Diastolic dysfunction grade 2 is present.   anterior, septal hypokinesis   No evidence of left ventricular mass or thrombus noted.    RCRI = 0.9   METs 4    Current Medications: torsemide, spironolactone, entresto, omeprazole, coreg, wellbutrin, allopurinol    Is the patient currently taking an anticoagulant? If so, what is the diagnosis the patient has been given to warrant the need for the anticoagulant? none    Additional Notes: requesting cardiac clearance

## 2025-02-26 ENCOUNTER — TELEPHONE (OUTPATIENT)
Dept: CARDIOLOGY CLINIC | Age: 50
End: 2025-02-26

## 2025-03-05 ENCOUNTER — TRANSCRIBE ORDERS (OUTPATIENT)
Dept: ADMINISTRATIVE | Facility: HOSPITAL | Age: 50
End: 2025-03-05
Payer: COMMERCIAL

## 2025-03-05 ENCOUNTER — HOSPITAL ENCOUNTER (OUTPATIENT)
Dept: GENERAL RADIOLOGY | Facility: HOSPITAL | Age: 50
Discharge: HOME | End: 2025-03-05
Admitting: NURSE PRACTITIONER
Payer: COMMERCIAL

## 2025-03-05 DIAGNOSIS — M15.0 PRIMARY GENERALIZED (OSTEO)ARTHRITIS: Primary | ICD-10-CM

## 2025-03-05 DIAGNOSIS — I10 ESSENTIAL (PRIMARY) HYPERTENSION: ICD-10-CM

## 2025-03-05 DIAGNOSIS — M15.0 PRIMARY GENERALIZED (OSTEO)ARTHRITIS: ICD-10-CM

## 2025-03-05 PROCEDURE — 71046 X-RAY EXAM CHEST 2 VIEWS: CPT

## 2025-03-05 RX ORDER — TORSEMIDE 10 MG/1
20 TABLET ORAL DAILY
Qty: 180 TABLET | Refills: 1 | Status: SHIPPED | OUTPATIENT
Start: 2025-03-05

## 2025-04-25 DIAGNOSIS — Z95.810 BIVENTRICULAR ICD (IMPLANTABLE CARDIOVERTER-DEFIBRILLATOR) IN PLACE: Primary | ICD-10-CM

## 2025-04-25 DIAGNOSIS — I47.29 NSVT (NONSUSTAINED VENTRICULAR TACHYCARDIA) (HCC): ICD-10-CM

## 2025-04-25 DIAGNOSIS — I42.8 NONISCHEMIC CARDIOMYOPATHY (HCC): ICD-10-CM

## 2025-04-25 DIAGNOSIS — I50.41 ACUTE COMBINED SYSTOLIC (CONGESTIVE) AND DIASTOLIC (CONGESTIVE) HEART FAILURE (HCC): ICD-10-CM

## 2025-06-07 DIAGNOSIS — I50.41 ACUTE COMBINED SYSTOLIC (CONGESTIVE) AND DIASTOLIC (CONGESTIVE) HEART FAILURE (HCC): ICD-10-CM

## 2025-06-07 DIAGNOSIS — I42.8 NONISCHEMIC CARDIOMYOPATHY (HCC): ICD-10-CM

## 2025-06-09 ENCOUNTER — TELEPHONE (OUTPATIENT)
Dept: CARDIOLOGY CLINIC | Age: 50
End: 2025-06-09

## 2025-06-09 RX ORDER — POTASSIUM CHLORIDE 750 MG/1
10 TABLET, EXTENDED RELEASE ORAL EVERY MORNING
Qty: 90 TABLET | Refills: 0 | Status: SHIPPED | OUTPATIENT
Start: 2025-06-09

## 2025-06-09 RX ORDER — SACUBITRIL AND VALSARTAN 24; 26 MG/1; MG/1
1 TABLET, FILM COATED ORAL 2 TIMES DAILY
Qty: 180 TABLET | Refills: 0 | Status: SHIPPED | OUTPATIENT
Start: 2025-06-09

## 2025-06-09 RX ORDER — SPIRONOLACTONE 25 MG/1
TABLET ORAL
Qty: 180 TABLET | Refills: 0 | Status: SHIPPED | OUTPATIENT
Start: 2025-06-09

## 2025-06-09 NOTE — TELEPHONE ENCOUNTER
Patient had appt with Ruchi 10.21.24 at [facility] and was supposed to follow up in 6 months. Appt never made. Please reach out to patient to schedule. Discovered while refilling medications today.

## 2025-06-09 NOTE — TELEPHONE ENCOUNTER
Called and left VM for patient to return call so we may get them scheduled with Blank Sahu in [facility] office. 6/9/25 AH

## 2025-07-14 ENCOUNTER — OFFICE VISIT (OUTPATIENT)
Age: 50
End: 2025-07-14

## 2025-07-14 VITALS
HEART RATE: 105 BPM | HEIGHT: 70 IN | BODY MASS INDEX: 31.35 KG/M2 | OXYGEN SATURATION: 97 % | WEIGHT: 219 LBS | DIASTOLIC BLOOD PRESSURE: 68 MMHG | SYSTOLIC BLOOD PRESSURE: 102 MMHG

## 2025-07-14 DIAGNOSIS — Z95.810 BIVENTRICULAR ICD (IMPLANTABLE CARDIOVERTER-DEFIBRILLATOR) IN PLACE: ICD-10-CM

## 2025-07-14 DIAGNOSIS — I10 PRIMARY HYPERTENSION: ICD-10-CM

## 2025-07-14 DIAGNOSIS — I47.29 NSVT (NONSUSTAINED VENTRICULAR TACHYCARDIA) (HCC): ICD-10-CM

## 2025-07-14 DIAGNOSIS — R06.02 SHORTNESS OF BREATH: ICD-10-CM

## 2025-07-14 DIAGNOSIS — I42.8 NONISCHEMIC CARDIOMYOPATHY (HCC): ICD-10-CM

## 2025-07-14 DIAGNOSIS — R06.02 SHORTNESS OF BREATH: Primary | ICD-10-CM

## 2025-07-14 DIAGNOSIS — I50.42 CHRONIC COMBINED SYSTOLIC AND DIASTOLIC HEART FAILURE (HCC): ICD-10-CM

## 2025-07-14 LAB
ANION GAP SERPL CALCULATED.3IONS-SCNC: 14 MMOL/L (ref 8–16)
BNP BLD-MCNC: 178 PG/ML (ref 0–124)
BUN SERPL-MCNC: 20 MG/DL (ref 6–20)
CALCIUM SERPL-MCNC: 9.7 MG/DL (ref 8.6–10)
CHLORIDE SERPL-SCNC: 97 MMOL/L (ref 98–107)
CO2 SERPL-SCNC: 26 MMOL/L (ref 22–29)
CREAT SERPL-MCNC: 1.6 MG/DL (ref 0.7–1.2)
GLUCOSE SERPL-MCNC: 94 MG/DL (ref 70–99)
MAGNESIUM SERPL-MCNC: 2.2 MG/DL (ref 1.6–2.6)
POTASSIUM SERPL-SCNC: 4.3 MMOL/L (ref 3.5–5.1)
SODIUM SERPL-SCNC: 137 MMOL/L (ref 136–145)

## 2025-07-14 RX ORDER — DAPAGLIFLOZIN 10 MG/1
10 TABLET, FILM COATED ORAL DAILY
COMMUNITY
Start: 2025-06-07

## 2025-07-14 ASSESSMENT — ENCOUNTER SYMPTOMS
CHEST TIGHTNESS: 0
SHORTNESS OF BREATH: 1
COUGH: 0
ABDOMINAL PAIN: 0
VOMITING: 0
EYE REDNESS: 0
WHEEZING: 0
FACIAL SWELLING: 0
NAUSEA: 0

## 2025-07-14 NOTE — PROGRESS NOTES
[facility] [place], [facility]  [address]  [address]  Phone: [phone]  Fax: [phone]    OFFICE VISIT:  2025    Javier Vela - : 1975    Reason For Visit:  Javier is a 50 y.o. male who is here for Follow-up (No cardiac symptoms) and Nonischemic cardiomyopathy       Diagnosis Orders   1. 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  He was treated with Entresto, carvedilol, and diuretic therapy.  Repeat echocardiogram was obtained 3 months later [10/25/2022] with no significant change in ventricular function -estimated ejection fraction 35%.  RVSP not reassessed.  He was offered a LifeVest and debrillator,  but declined at the time    He returned to the office later in  and did go forward with a defibrillator on 2023.      History of ICD discharge while working outdoors on a hot day    He has been unable to tolerate higher doses of carvedilol due to problems with lower blood pressure and weakness.    He has lost 21 pounds since February and has had knee surgery surgery.    He has had no significant change in symptoms.  He has some mild, chronic dyspnea, but denies orthopnea, PND.  He denies chest pain.      PCP added Farxiga a few months back     Dale Suarez APRN - CNP is PCP.  Javier Vela has the following history as recorded in [facility]:    Patient Active Problem List    Diagnosis Date Noted    Nonischemic cardiomyopathy (HCC) 2022    Acute combined systolic (congestive) and diastolic (congestive) heart failure (HCC) 2022    Chronic combined systolic and diastolic

## 2025-07-14 NOTE — PATIENT INSTRUCTIONS
Return in about 6 months (around 1/14/2026) for APRN.  Lab- BNP, BMP, Magnesium    OK to take an extra Torsemide for weight gain over 3lbs in 24 hours or 5lbs over a week.   If weight is not improving by the next day, call the office.     - Weigh daily every morning after urinating (this is your dry weight).   Report weight gain of 3lbs or more in 24hrs or 5lbs in one week.  - Call for increasing shortness of breath or increasing swelling in feet and legs.    (This could mean you are retaining too much fluid)  - 2000mg low sodium diet

## 2025-07-15 DIAGNOSIS — I50.41 ACUTE COMBINED SYSTOLIC (CONGESTIVE) AND DIASTOLIC (CONGESTIVE) HEART FAILURE (HCC): ICD-10-CM

## 2025-07-15 DIAGNOSIS — I42.8 NONISCHEMIC CARDIOMYOPATHY (HCC): ICD-10-CM

## 2025-07-15 RX ORDER — SPIRONOLACTONE 25 MG/1
25 TABLET ORAL DAILY
Qty: 90 TABLET | Refills: 3 | Status: SHIPPED | OUTPATIENT
Start: 2025-07-15

## 2025-07-15 RX ORDER — TORSEMIDE 10 MG/1
10 TABLET ORAL DAILY
Qty: 90 TABLET | Refills: 3 | Status: SHIPPED | OUTPATIENT
Start: 2025-07-15

## 2025-08-25 ENCOUNTER — OFFICE VISIT (OUTPATIENT)
Age: 50
End: 2025-08-25
Payer: COMMERCIAL

## 2025-08-25 VITALS
DIASTOLIC BLOOD PRESSURE: 70 MMHG | BODY MASS INDEX: 32.21 KG/M2 | HEIGHT: 70 IN | HEART RATE: 81 BPM | SYSTOLIC BLOOD PRESSURE: 104 MMHG | WEIGHT: 225 LBS

## 2025-08-25 DIAGNOSIS — I47.10 PAROXYSMAL SVT (SUPRAVENTRICULAR TACHYCARDIA): ICD-10-CM

## 2025-08-25 DIAGNOSIS — I47.29 NSVT (NONSUSTAINED VENTRICULAR TACHYCARDIA) (HCC): ICD-10-CM

## 2025-08-25 DIAGNOSIS — R06.02 SHORTNESS OF BREATH: Primary | ICD-10-CM

## 2025-08-25 DIAGNOSIS — R06.83 SNORING: ICD-10-CM

## 2025-08-25 DIAGNOSIS — Z95.810 BIVENTRICULAR ICD (IMPLANTABLE CARDIOVERTER-DEFIBRILLATOR) IN PLACE: ICD-10-CM

## 2025-08-25 DIAGNOSIS — I50.42 CHRONIC COMBINED SYSTOLIC AND DIASTOLIC HEART FAILURE (HCC): ICD-10-CM

## 2025-08-25 DIAGNOSIS — R06.02 SHORTNESS OF BREATH: ICD-10-CM

## 2025-08-25 DIAGNOSIS — I10 PRIMARY HYPERTENSION: ICD-10-CM

## 2025-08-25 DIAGNOSIS — I42.8 NONISCHEMIC CARDIOMYOPATHY (HCC): ICD-10-CM

## 2025-08-25 PROCEDURE — 99214 OFFICE O/P EST MOD 30 MIN: CPT | Performed by: CLINICAL NURSE SPECIALIST

## 2025-08-25 PROCEDURE — 3078F DIAST BP <80 MM HG: CPT | Performed by: CLINICAL NURSE SPECIALIST

## 2025-08-25 PROCEDURE — 3074F SYST BP LT 130 MM HG: CPT | Performed by: CLINICAL NURSE SPECIALIST

## 2025-08-25 RX ORDER — CARVEDILOL 3.12 MG/1
3.12 TABLET ORAL 2 TIMES DAILY
Qty: 180 TABLET | Refills: 3 | Status: SHIPPED | OUTPATIENT
Start: 2025-08-25

## 2025-08-25 RX ORDER — TORSEMIDE 20 MG/1
20 TABLET ORAL DAILY
Qty: 100 TABLET | Refills: 1 | Status: SHIPPED | OUTPATIENT
Start: 2025-08-25

## 2025-08-25 RX ORDER — CARVEDILOL 3.12 MG/1
3.12 TABLET ORAL 2 TIMES DAILY
Qty: 180 TABLET | Refills: 1 | Status: SHIPPED | OUTPATIENT
Start: 2025-08-25 | End: 2025-08-25 | Stop reason: DRUGHIGH

## 2025-08-25 RX ORDER — TORSEMIDE 10 MG/1
10 TABLET ORAL DAILY
Qty: 90 TABLET | Refills: 3 | Status: SHIPPED | OUTPATIENT
Start: 2025-08-25 | End: 2025-08-25 | Stop reason: DRUGHIGH

## 2025-08-25 ASSESSMENT — ENCOUNTER SYMPTOMS
EYE REDNESS: 0
ABDOMINAL PAIN: 0
COUGH: 0
WHEEZING: 0
FACIAL SWELLING: 0
VOMITING: 0
CHEST TIGHTNESS: 0
NAUSEA: 0
SHORTNESS OF BREATH: 1

## 2025-08-31 DIAGNOSIS — I50.41 ACUTE COMBINED SYSTOLIC (CONGESTIVE) AND DIASTOLIC (CONGESTIVE) HEART FAILURE (HCC): ICD-10-CM

## 2025-08-31 DIAGNOSIS — I42.8 NONISCHEMIC CARDIOMYOPATHY (HCC): ICD-10-CM

## 2025-09-02 DIAGNOSIS — I50.41 ACUTE COMBINED SYSTOLIC (CONGESTIVE) AND DIASTOLIC (CONGESTIVE) HEART FAILURE (HCC): ICD-10-CM

## 2025-09-02 DIAGNOSIS — I42.8 NONISCHEMIC CARDIOMYOPATHY (HCC): ICD-10-CM

## 2025-09-02 RX ORDER — POTASSIUM CHLORIDE 750 MG/1
10 TABLET, EXTENDED RELEASE ORAL EVERY MORNING
Qty: 90 TABLET | Refills: 3 | Status: SHIPPED | OUTPATIENT
Start: 2025-09-02 | End: 2025-09-02 | Stop reason: SDUPTHER

## 2025-09-02 RX ORDER — POTASSIUM CHLORIDE 750 MG/1
10 TABLET, EXTENDED RELEASE ORAL EVERY MORNING
Qty: 90 TABLET | Refills: 3 | Status: SHIPPED | OUTPATIENT
Start: 2025-09-02

## 2025-09-02 RX ORDER — SPIRONOLACTONE 25 MG/1
25 TABLET ORAL DAILY
Qty: 90 TABLET | Refills: 3 | Status: SHIPPED | OUTPATIENT
Start: 2025-09-02

## 2025-09-02 RX ORDER — SPIRONOLACTONE 25 MG/1
TABLET ORAL
Qty: 180 TABLET | Refills: 1 | Status: SHIPPED | OUTPATIENT
Start: 2025-09-02 | End: 2025-09-02 | Stop reason: SDUPTHER